# Patient Record
Sex: MALE | Race: WHITE
[De-identification: names, ages, dates, MRNs, and addresses within clinical notes are randomized per-mention and may not be internally consistent; named-entity substitution may affect disease eponyms.]

---

## 2021-05-06 ENCOUNTER — HOSPITAL ENCOUNTER (INPATIENT)
Dept: HOSPITAL 56 - MW.ED | Age: 76
LOS: 1 days | Discharge: NURSING, REHAB OR LONG-TERM CARE | DRG: 64 | End: 2021-05-07
Attending: INTERNAL MEDICINE | Admitting: INTERNAL MEDICINE
Payer: MEDICARE

## 2021-05-06 DIAGNOSIS — G93.6: ICD-10-CM

## 2021-05-06 DIAGNOSIS — Z20.822: ICD-10-CM

## 2021-05-06 DIAGNOSIS — R47.81: ICD-10-CM

## 2021-05-06 DIAGNOSIS — I63.9: Primary | ICD-10-CM

## 2021-05-06 DIAGNOSIS — R29.702: ICD-10-CM

## 2021-05-06 DIAGNOSIS — F17.200: ICD-10-CM

## 2021-05-06 DIAGNOSIS — Z66: ICD-10-CM

## 2021-05-06 DIAGNOSIS — I77.74: ICD-10-CM

## 2021-05-06 DIAGNOSIS — I10: ICD-10-CM

## 2021-05-06 LAB
BUN SERPL-MCNC: 15 MG/DL (ref 7–18)
CHLORIDE SERPL-SCNC: 104 MMOL/L (ref 98–107)
CO2 SERPL-SCNC: 23.6 MMOL/L (ref 21–32)
GLUCOSE SERPL-MCNC: 136 MG/DL (ref 74–106)
HBA1C BLD-MCNC: 5.7 %
LIPASE SERPL-CCNC: 75 U/L (ref 73–393)
POTASSIUM SERPL-SCNC: 3.5 MMOL/L (ref 3.5–5.1)
SODIUM SERPL-SCNC: 139 MMOL/L (ref 136–148)

## 2021-05-06 PROCEDURE — 93005 ELECTROCARDIOGRAM TRACING: CPT

## 2021-05-06 PROCEDURE — 71045 X-RAY EXAM CHEST 1 VIEW: CPT

## 2021-05-06 PROCEDURE — U0002 COVID-19 LAB TEST NON-CDC: HCPCS

## 2021-05-06 PROCEDURE — 84443 ASSAY THYROID STIM HORMONE: CPT

## 2021-05-06 PROCEDURE — 85730 THROMBOPLASTIN TIME PARTIAL: CPT

## 2021-05-06 PROCEDURE — 70450 CT HEAD/BRAIN W/O DYE: CPT

## 2021-05-06 PROCEDURE — 99285 EMERGENCY DEPT VISIT HI MDM: CPT

## 2021-05-06 PROCEDURE — 36415 COLL VENOUS BLD VENIPUNCTURE: CPT

## 2021-05-06 PROCEDURE — 70496 CT ANGIOGRAPHY HEAD: CPT

## 2021-05-06 PROCEDURE — 85025 COMPLETE CBC W/AUTO DIFF WBC: CPT

## 2021-05-06 PROCEDURE — 84439 ASSAY OF FREE THYROXINE: CPT

## 2021-05-06 PROCEDURE — 84484 ASSAY OF TROPONIN QUANT: CPT

## 2021-05-06 PROCEDURE — 85610 PROTHROMBIN TIME: CPT

## 2021-05-06 PROCEDURE — 83036 HEMOGLOBIN GLYCOSYLATED A1C: CPT

## 2021-05-06 PROCEDURE — 83690 ASSAY OF LIPASE: CPT

## 2021-05-06 PROCEDURE — 84481 FREE ASSAY (FT-3): CPT

## 2021-05-06 PROCEDURE — 80053 COMPREHEN METABOLIC PANEL: CPT

## 2021-05-06 PROCEDURE — 70498 CT ANGIOGRAPHY NECK: CPT

## 2021-05-06 PROCEDURE — 96374 THER/PROPH/DIAG INJ IV PUSH: CPT

## 2021-05-06 NOTE — PCM.CONS
H&P History of Present Illness





- General


Date of Service: 05/06/21


Admit Problem/Dx: 


                           Admission Diagnosis/Problem





Admission Diagnosis/Problem      Cerebellar infarction











- History of Present Illness


Initial Comments - Free Text/Narative: 





At about noon yesterday, he developed nausea, vomiting and diarrhea.  He was up 

vomiting throughout the night and noted dizziness, vertigo imbalance, left upper

limb numbness and clumsiness during the night.   He has mild headache currently.

 He endorses diplopia. He smokes.  





- Related Data


Allergies/Adverse Reactions: 


                                    Allergies











Allergy/AdvReac Type Severity Reaction Status Date / Time


 


No Known Allergies Allergy   Verified 05/06/21 11:55











Home Medications: 


                                    Home Meds





. [No Known Home Meds]  05/06/21 [History]











Past Medical History





- Infectious Disease History


Infectious Disease History: Reports: None





Social & Family History





- Family History


Family Medical History: No Pertinent Family History





- Tobacco Use


Tobacco Use Status *Q: Current Some Day Tobacco User





- Caffeine Use


Caffeine Use: Reports: None





- Recreational Drug Use


Recreational Drug Use: No





H&P Review of Systems





- Review of Systems:


Review Of Systems: See Below


General: Reports: No Symptoms


HEENT: Reports: Headaches


Cardiovascular: Reports: No Symptoms


Gastrointestinal: Reports: Diarrhea, Vomiting





Exam





- Exam


Exam: See Below





- Vital Signs


Vital Signs: 


                                Last Vital Signs











Temp  37.4 C   05/06/21 12:04


 


Pulse  73   05/06/21 12:04


 


Resp  18   05/06/21 12:04


 


BP  137/75   05/06/21 12:04


 


Pulse Ox  95   05/06/21 12:04











Weight: 93.894 kg





- Exam


Physical Exam Comments:: 





Neurological: 





Mental Status:


   General: Normal activity, good hygiene, appropriate appearance.


   Level of consciousness: Awake, alert.


   Orientation: Oriented to person, place, time and situation.


      


Cranial Nerves:  VFFTC, PERRL, Marked deviation of both eyes to left, slightly 

dysconjugate,intermittent nystagmus, Mild cerebellar dysarthria. 





Motor: Power 5/5 throughout.  





Sensation: Sensation is intact to temperature. 





Deep tendon reflexes:  3+ at knees, o/w 2+





Coordination:    Finger to nose mildly impaired on the left, heel to shin 

impaired bilaterally. 





Gait: Not assessed. 





- Patient Data


Lab Results Last 24 hrs: 


                         Laboratory Results - last 24 hr











  05/06/21 05/06/21 05/06/21 Range/Units





  08:24 08:25 08:25 


 


WBC   15.72 H   (4.0-11.0)  K/uL


 


RBC   5.35   (4.50-5.90)  M/uL


 


Hgb   15.7   (13.0-17.0)  g/dL


 


Hct   45.3   (38.0-50.0)  %


 


MCV   84.7   (80.0-98.0)  fL


 


MCH   29.3   (27.0-32.0)  pg


 


MCHC   34.7   (31.0-37.0)  g/dL


 


RDW Std Deviation   44.9   (28.0-62.0)  fl


 


RDW Coeff of Sumi   15   (11.0-15.0)  %


 


Plt Count   215   (150-400)  K/uL


 


MPV   11.00   (7.40-12.00)  fL


 


Neut % (Auto)   84.9 H   (48.0-80.0)  %


 


Lymph % (Auto)   10.0 L   (16.0-40.0)  %


 


Mono % (Auto)   4.9   (0.0-15.0)  %


 


Eos % (Auto)   0.1   (0.0-7.0)  %


 


Baso % (Auto)   0.1   (0.0-1.5)  %


 


Neut # (Auto)   13.4 H   (1.4-5.7)  K/uL


 


Lymph # (Auto)   1.6   (0.6-2.4)  K/uL


 


Mono # (Auto)   0.8   (0.0-0.8)  K/uL


 


Eos # (Auto)   0.0   (0.0-0.7)  K/uL


 


Baso # (Auto)   0.0   (0.0-0.1)  K/uL


 


Nucleated RBC %   0.0   /100WBC


 


Nucleated RBCs #   0   K/uL


 


INR    1.17  


 


APTT    25.5  (18.6-31.3)  SEC


 


Sodium     (136-148)  mmol/L


 


Potassium     (3.5-5.1)  mmol/L


 


Chloride     ()  mmol/L


 


Carbon Dioxide     (21.0-32.0)  mmol/L


 


BUN     (7.0-18.0)  mg/dL


 


Creatinine     (0.8-1.3)  mg/dL


 


Est Cr Clr Drug Dosing     mL/min


 


Estimated GFR (MDRD)     ml/min


 


Glucose     ()  mg/dL


 


Hemoglobin A1c  5.7   (4.5 - 6.2) %


 


Calcium     (8.5-10.1)  mg/dL


 


Total Bilirubin     (0.2-1.0)  mg/dL


 


AST     (15-37)  IU/L


 


ALT     (14-63)  IU/L


 


Alkaline Phosphatase     ()  U/L


 


Troponin I     (0.000-0.056)  ng/mL


 


Total Protein     (6.4-8.2)  g/dL


 


Albumin     (3.4-5.0)  g/dL


 


Globulin     (2.6-4.0)  g/dL


 


Albumin/Globulin Ratio     (0.9-1.6)  


 


Lipase     ()  U/L


 


TSH 3rd Generation     (0.36-3.74)  uIU/mL


 


SARS-CoV-2 RNA (TEZ)     (NEGATIVE)  














  05/06/21 05/06/21 Range/Units





  08:25 09:37 


 


WBC    (4.0-11.0)  K/uL


 


RBC    (4.50-5.90)  M/uL


 


Hgb    (13.0-17.0)  g/dL


 


Hct    (38.0-50.0)  %


 


MCV    (80.0-98.0)  fL


 


MCH    (27.0-32.0)  pg


 


MCHC    (31.0-37.0)  g/dL


 


RDW Std Deviation    (28.0-62.0)  fl


 


RDW Coeff of Sumi    (11.0-15.0)  %


 


Plt Count    (150-400)  K/uL


 


MPV    (7.40-12.00)  fL


 


Neut % (Auto)    (48.0-80.0)  %


 


Lymph % (Auto)    (16.0-40.0)  %


 


Mono % (Auto)    (0.0-15.0)  %


 


Eos % (Auto)    (0.0-7.0)  %


 


Baso % (Auto)    (0.0-1.5)  %


 


Neut # (Auto)    (1.4-5.7)  K/uL


 


Lymph # (Auto)    (0.6-2.4)  K/uL


 


Mono # (Auto)    (0.0-0.8)  K/uL


 


Eos # (Auto)    (0.0-0.7)  K/uL


 


Baso # (Auto)    (0.0-0.1)  K/uL


 


Nucleated RBC %    /100WBC


 


Nucleated RBCs #    K/uL


 


INR    


 


APTT    (18.6-31.3)  SEC


 


Sodium  139   (136-148)  mmol/L


 


Potassium  3.5   (3.5-5.1)  mmol/L


 


Chloride  104   ()  mmol/L


 


Carbon Dioxide  23.6   (21.0-32.0)  mmol/L


 


BUN  15   (7.0-18.0)  mg/dL


 


Creatinine  1.0   (0.8-1.3)  mg/dL


 


Est Cr Clr Drug Dosing  76.49   mL/min


 


Estimated GFR (MDRD)  > 60.0   ml/min


 


Glucose  136 H   ()  mg/dL


 


Hemoglobin A1c   (4.5 - 6.2) %


 


Calcium  9.1   (8.5-10.1)  mg/dL


 


Total Bilirubin  0.4   (0.2-1.0)  mg/dL


 


AST  18   (15-37)  IU/L


 


ALT  25   (14-63)  IU/L


 


Alkaline Phosphatase  75   ()  U/L


 


Troponin I  < 0.050   (0.000-0.056)  ng/mL


 


Total Protein  7.5   (6.4-8.2)  g/dL


 


Albumin  3.8   (3.4-5.0)  g/dL


 


Globulin  3.7   (2.6-4.0)  g/dL


 


Albumin/Globulin Ratio  1.0   (0.9-1.6)  


 


Lipase  75   ()  U/L


 


TSH 3rd Generation  0.01 L   (0.36-3.74)  uIU/mL


 


SARS-CoV-2 RNA (TEZ)   NEGATIVE  (NEGATIVE)  











Result Diagrams: 


                                 05/06/21 08:25





                                 05/06/21 08:25


Imaging Impressions Last 24 hrs: 


CTA head and neck 5/6/2021  abrupt cut off of right vertebral arter just above 

origin with minimal perfusion distal vertebral artery


CT head loss of gray white differentiation in right cerebellum





Sepsis Event Note





- Evaluation


Sepsis Screening Result: No Definite Risk





- Focused Exam


Vital Signs: 


                                   Vital Signs











  Temp Pulse Resp BP BP Pulse Ox


 


 05/06/21 12:04  37.4 C  73  18  137/75   95


 


 05/06/21 12:02  37.4 C  74  18  137/75   95


 


 05/06/21 11:17   70    157/83 H  97


 


 05/06/21 11:10   78  17   155/80 H  95


 


 05/06/21 10:39   73  16   164/88 H  96


 


 05/06/21 10:10   74  16   161/85 H  97


 


 05/06/21 09:55   76  16   153/77 H  95


 


 05/06/21 09:40   81  16   154/80 H  95


 


 05/06/21 09:25   80  16   155/83 H  94 L


 


 05/06/21 09:11   81  17   163/84 H  94 L


 


 05/06/21 08:28  37.5 C  106 H  17   161/79 H  98














*Q Meaningful Use (ADM)





- VTE *Q


VTE Anticoagulation Contraindications: Med/TX Not Indicated/Need





Consult PN Assessment/Plan


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


SNOMED Code(s): 386831370


   Code(s): I63.9 - CEREBRAL INFARCTION, UNSPECIFIED; I77.70 - DISSECTION OF 

UNSPECIFIED ARTERY   Current Visit: Yes   


Assessment:: 


-No TPA as outside window





-Thrombectomy is not recommended.  Data on posterior strokes is limited.  It is 

reasonable in many cases, but symptoms present nearly 24 hour upon presentation 

and finding seen on CT posing high risk for hemorrhage. 





-Start  mg daily.  Based on CADISS trial- no superiority of 

anticoagulation vs. anti platelets was noted, but overall risk was low, so 

inadequately powered.   Anticoagualation probably poses a higher risk of 

hemorrhage, and with location and size of his stroke, hemorrhagic transformation

 could be catastrophic.   I recommend  mg for 10-14 days then transition 

to anticoagulation with warfarin or DOAC. 





-MRI brain w/o contrast  eval extent of stroke





-echo, telemetry





-start high potency statin





-lipid A1c





-PT/OT/speech consult





-permissive HTN up to 180/105





-Endovascular intervention data is limited, so risk / benefit is not 

established.  It is generally reserved  for cases of recurrent stroke despite 

antithrombotic therapy. 





Problem List Initiated/Reviewed/Updated: Yes

## 2021-05-06 NOTE — PCM.HP.2
H&P History of Present Illness





- General


Date of Service: 05/06/21


Admit Problem/Dx: 


                           Admission Diagnosis/Problem





Admission Diagnosis/Problem      Cerebellar infarction








Source of Information: Patient


History Limitations: Reports: No Limitations





- History of Present Illness


Initial Comments - Free Text/Narative: 





This 74-year-old male with little to no past medical history presented to the ER

today with complaints of balance issues along with dizziness and left upper limb

numbness along with clumsiness.  He reports yesterday around noonhe started 

having some nausea and vomiting along with diarrhea.  He reports he was up 

vomiting most of the night.  He also reports difficulty with vision as he feels 

that may be doubled or like his vision is crossed which he reports started maybe

around 5:00 in the evening.  He reports that he is having difficulty walking 

likely secondary to his vision and feels very out of sorts when trying to walk. 

He denies any trauma no recent falls or motor vehicle accidents.  He denies any 

fevers chills, neck pain, palpitations, chest pain, shortness of breath.  He 

denies any abdominal pain no constipation or diarrhea, no dysuria or troubles u

rinating.  Denies any black or bloody bowel movements.  He denies recreational 

drug use and no alcohol use.  He reports he does not use tobacco on a regular 

basis but does intermittently smoke cigars.  He denies any family history of 

CAD, diabetes and or stroke.  Reports his father did have a stroke in his mid 

80s but otherwise most of family is otherwise healthy.





In the ER leukocytosis noted at 15,000 sodium 139, potassium 3.5, BUN 15 

creatinine 1.0 glucose 136.  Troponin negative TSH 0.01 T4 1.34 T3 2.3 Covid 

negative.  Due to stroke symptoms head CT was obtained which was noted to have 

question evolving loss of gray-white differentiation within the right superior 

and inferior cerebellum which may represent subacute infarct.  CTA of head and 

neck were then obtained secondary to findings on head CT.  CTA of head and neck 

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 

origin to its proximal to mid intracranial segment with retrograde filling of 

the mid distal intracranial segment by way of retrograde flow from the basilar 

trunk.  No intracranial large vessel occlusion or flow-limiting stenosis, mildly

patent cervical internal carotid artery and left vertebral artery.  On-call 

neurologist Dr. Pulido was consulted by ER physician at this time.  Patient 

is outside of the window for TPA and recommended initiation of antiplatelet 

therapy with full dose aspirin at 325 mg.  Dr. Pulido will consult and felt 

patient was safe for admission within our facility.





- Related Data


Allergies/Adverse Reactions: 


                                    Allergies











Allergy/AdvReac Type Severity Reaction Status Date / Time


 


No Known Allergies Allergy   Verified 05/06/21 11:55











Home Medications: 


                                    Home Meds





. [No Known Home Meds]  05/06/21 [History]











Past Medical History


Cardiovascular History: Reports: None.  Denies: Afib, Blood Clots/VTE/DVT, CAD, 

Stents


Respiratory History: Reports: None.  Denies: Asthma, COPD, PE


Gastrointestinal History: Reports: None


Genitourinary History: Reports: None


Neurological History: Reports: None.  Denies: CVA


Psychiatric History: Reports: None


Endocrine/Metabolic History: Reports: None.  Denies: Diabetes, Type II, 

Obesity/BMI 30+





- Infectious Disease History


Infectious Disease History: Reports: None





- Past Surgical History


HEENT Surgical History: Reports: None


Cardiovascular Surgical History: Reports: None


GI Surgical History: Reports: None


Endocrine Surgical History: Reports: None





Social & Family History





- Family History


Family Medical History: No Pertinent Family History


Cardiac: Denies: Afib, Blood Clots/VTE/DVT, Cardiomyopathy, Heart Failure, MI


Neurological: Reports: CVA (father in his 80s)


Endocrine/Metabolic: Reports: None





- Tobacco Use


Tobacco Use Status *Q: Never Tobacco User





- Caffeine Use


Caffeine Use: Reports: None





- Recreational Drug Use


Recreational Drug Use: No





H&P Review of Systems





- Review of Systems:


Review Of Systems: See Below


General: Reports: No Symptoms.  Denies: Fever, Chills, Malaise, Weakness


HEENT: Reports: Headaches, Vertigo, Visual Changes.  Denies: Sinus Congestion


Pulmonary: Reports: No Symptoms.  Denies: Shortness of Breath


Cardiovascular: Reports: No Symptoms.  Denies: Chest Pain


Gastrointestinal: Reports: No Symptoms.  Denies: Abdominal Pain, Black Stool, 

Bloody Stool, Nausea, Vomiting


Genitourinary: Reports: No Symptoms.  Denies: Dysuria, Frequency, Burning


Musculoskeletal: Reports: No Symptoms.  Denies: Neck Pain, Arm Pain, Muscle Pain


Skin: Reports: No Symptoms.  Denies: Erythema, Wound


Psychiatric: Reports: No Symptoms.  Denies: Anxiety, Agitation


Neurological: Reports: Headache, Numbness, Difficulty Walking, Change in Speech,

 Gait Disturbance


Hematologic/Lymphatic: Reports: No Symptoms


Immunologic: Reports: No Symptoms





Exam





- Exam


Exam: See Below





- Vital Signs


Vital Signs: 


                                Last Vital Signs











Temp  99.5 F   05/06/21 08:28


 


Pulse  74   05/06/21 10:10


 


Resp  16   05/06/21 10:10


 


BP  161/85 H  05/06/21 10:10


 


Pulse Ox  97   05/06/21 10:10











Weight: 93.894 kg





- Exam


General: Alert, Oriented, Cooperative


HEENT: Mucosa Moist & [place], Posterior Pharynx Clear, Pupils Equal, Pupils 

Reactive, Other (Deviation of both eyes to Left).  No: Hearing Intact (slightly 

hard of hearing)


Neck: Supple, Trachea Midline


Lungs: Clear to Auscultation, Normal Respiratory Effort


Cardiovascular: Regular Rate, Regular Rhythm, Normal S1, Normal S2.  No: 

Irregular Rhythm, Tachycardia


GI/Abdominal Exam: Normal Bowel Sounds, Soft, Non-Tender


Extremities: Normal Inspection, Normal Range of Motion, Non-Tender, No Pedal 

Edema


Skin: Warm, Dry, Intact


Neurological: Reflexes Equal Bilateral, Strength Equal Bilateral.  No: Normal 

Gait, Normal Speech (pressured and slurring noted)


Neuro Extensive - Mental Status: Alert, Oriented x3, Normal Mood/Affect, Normal 

Cognition, Memory Intact


Neuro Extensive - Motor, Sensory, Reflexes: Abnormal Finger to Nose, Abnormal 

Heel to [place], Abnormal Sensation (numbness noted to posterior L upper limb), Mot

or/Sensory Deficits.  No: Normal Gait


Psychiatric: Alert, Normal Affect, Normal Mood





- Patient Data


Lab Results Last 24 hrs: 


                         Laboratory Results - last 24 hr











  05/06/21 05/06/21 05/06/21 Range/Units





  08:25 08:25 08:25 


 


WBC  15.72 H    (4.0-11.0)  K/uL


 


RBC  5.35    (4.50-5.90)  M/uL


 


Hgb  15.7    (13.0-17.0)  g/dL


 


Hct  45.3    (38.0-50.0)  %


 


MCV  84.7    (80.0-98.0)  fL


 


MCH  29.3    (27.0-32.0)  pg


 


MCHC  34.7    (31.0-37.0)  g/dL


 


RDW Std Deviation  44.9    (28.0-62.0)  fl


 


RDW Coeff of Sumi  15    (11.0-15.0)  %


 


Plt Count  215    (150-400)  K/uL


 


MPV  11.00    (7.40-12.00)  fL


 


Neut % (Auto)  84.9 H    (48.0-80.0)  %


 


Lymph % (Auto)  10.0 L    (16.0-40.0)  %


 


Mono % (Auto)  4.9    (0.0-15.0)  %


 


Eos % (Auto)  0.1    (0.0-7.0)  %


 


Baso % (Auto)  0.1    (0.0-1.5)  %


 


Neut # (Auto)  13.4 H    (1.4-5.7)  K/uL


 


Lymph # (Auto)  1.6    (0.6-2.4)  K/uL


 


Mono # (Auto)  0.8    (0.0-0.8)  K/uL


 


Eos # (Auto)  0.0    (0.0-0.7)  K/uL


 


Baso # (Auto)  0.0    (0.0-0.1)  K/uL


 


Nucleated RBC %  0.0    /100WBC


 


Nucleated RBCs #  0    K/uL


 


INR   1.17   


 


APTT   25.5   (18.6-31.3)  SEC


 


Sodium    139  (136-148)  mmol/L


 


Potassium    3.5  (3.5-5.1)  mmol/L


 


Chloride    104  ()  mmol/L


 


Carbon Dioxide    23.6  (21.0-32.0)  mmol/L


 


BUN    15  (7.0-18.0)  mg/dL


 


Creatinine    1.0  (0.8-1.3)  mg/dL


 


Est Cr Clr Drug Dosing    76.49  mL/min


 


Estimated GFR (MDRD)    > 60.0  ml/min


 


Glucose    136 H  ()  mg/dL


 


Calcium    9.1  (8.5-10.1)  mg/dL


 


Total Bilirubin    0.4  (0.2-1.0)  mg/dL


 


AST    18  (15-37)  IU/L


 


ALT    25  (14-63)  IU/L


 


Alkaline Phosphatase    75  ()  U/L


 


Troponin I    < 0.050  (0.000-0.056)  ng/mL


 


Total Protein    7.5  (6.4-8.2)  g/dL


 


Albumin    3.8  (3.4-5.0)  g/dL


 


Globulin    3.7  (2.6-4.0)  g/dL


 


Albumin/Globulin Ratio    1.0  (0.9-1.6)  


 


Lipase    75  ()  U/L


 


TSH 3rd Generation    0.01 L  (0.36-3.74)  uIU/mL


 


SARS-CoV-2 RNA (TEZ)     (NEGATIVE)  














  05/06/21 Range/Units





  09:37 


 


WBC   (4.0-11.0)  K/uL


 


RBC   (4.50-5.90)  M/uL


 


Hgb   (13.0-17.0)  g/dL


 


Hct   (38.0-50.0)  %


 


MCV   (80.0-98.0)  fL


 


MCH   (27.0-32.0)  pg


 


MCHC   (31.0-37.0)  g/dL


 


RDW Std Deviation   (28.0-62.0)  fl


 


RDW Coeff of Sumi   (11.0-15.0)  %


 


Plt Count   (150-400)  K/uL


 


MPV   (7.40-12.00)  fL


 


Neut % (Auto)   (48.0-80.0)  %


 


Lymph % (Auto)   (16.0-40.0)  %


 


Mono % (Auto)   (0.0-15.0)  %


 


Eos % (Auto)   (0.0-7.0)  %


 


Baso % (Auto)   (0.0-1.5)  %


 


Neut # (Auto)   (1.4-5.7)  K/uL


 


Lymph # (Auto)   (0.6-2.4)  K/uL


 


Mono # (Auto)   (0.0-0.8)  K/uL


 


Eos # (Auto)   (0.0-0.7)  K/uL


 


Baso # (Auto)   (0.0-0.1)  K/uL


 


Nucleated RBC %   /100WBC


 


Nucleated RBCs #   K/uL


 


INR   


 


APTT   (18.6-31.3)  SEC


 


Sodium   (136-148)  mmol/L


 


Potassium   (3.5-5.1)  mmol/L


 


Chloride   ()  mmol/L


 


Carbon Dioxide   (21.0-32.0)  mmol/L


 


BUN   (7.0-18.0)  mg/dL


 


Creatinine   (0.8-1.3)  mg/dL


 


Est Cr Clr Drug Dosing   mL/min


 


Estimated GFR (MDRD)   ml/min


 


Glucose   ()  mg/dL


 


Calcium   (8.5-10.1)  mg/dL


 


Total Bilirubin   (0.2-1.0)  mg/dL


 


AST   (15-37)  IU/L


 


ALT   (14-63)  IU/L


 


Alkaline Phosphatase   ()  U/L


 


Troponin I   (0.000-0.056)  ng/mL


 


Total Protein   (6.4-8.2)  g/dL


 


Albumin   (3.4-5.0)  g/dL


 


Globulin   (2.6-4.0)  g/dL


 


Albumin/Globulin Ratio   (0.9-1.6)  


 


Lipase   ()  U/L


 


TSH 3rd Generation   (0.36-3.74)  uIU/mL


 


SARS-CoV-2 RNA (TEZ)  NEGATIVE  (NEGATIVE)  











Result Diagrams: 


                                 05/06/21 08:25





                                 05/06/21 08:25





Sepsis Event Note





- Evaluation


Sepsis Screening Result: No Definite Risk





- Focused Exam


Vital Signs: 


                                   Vital Signs











  Temp Pulse Resp BP Pulse Ox


 


 05/06/21 10:10   74  16  161/85 H  97


 


 05/06/21 09:55   76  16  153/77 H  95


 


 05/06/21 09:40   81  16  154/80 H  95


 


 05/06/21 09:25   80  16  155/83 H  94 L


 


 05/06/21 09:11   81  17  163/84 H  94 L


 


 05/06/21 08:28  99.5 F  106 H  17  161/79 H  98














- Problem List


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


SNOMED Code(s): 620078417


   ICD Code: I63.9 - CEREBRAL INFARCTION, UNSPECIFIED; I77.70 - DISSECTION OF 

UNSPECIFIED ARTERY   Status: Acute   Current Visit: Yes   


Problem List Initiated/Reviewed/Updated: Yes


Orders Last 24hrs: 


                               Active Orders 24 hr











 Category Date Time Status


 


 Patient Status [ADT] Routine ADT  05/06/21 10:09 Active


 


 Assess Neurological Status [RC] ASDIRECTED Care  05/06/21 08:34 Active


 


 Bedrest [RC] ASDIRECTED Care  05/06/21 08:34 Active


 


 Blood Glucose Check, Bedside [RC] STAT Care  05/06/21 08:35 Active


 


 Cardiac Monitoring [RC] .AS DIRECTED Care  05/06/21 08:34 Active


 


 EKG Documentation Completion [RC] STAT Care  05/06/21 08:34 Active


 


 Height and Weight [RC] UPON Care  05/06/21 08:34 Active


 


 Initiate Acute Stroke Protocol [RC] STAT Care  05/06/21 08:34 Active


 


 NIH Stroke Scale [RC] ASDIRECTED Care  05/06/21 08:34 Active


 


 Nursing Bedside Swallow Screen [RC] ASDIRECTED Care  05/06/21 08:34 Active


 


 Oxygen Therapy [RC] ASDIRECTED Care  05/06/21 08:34 Active


 


 Peripheral IV Care [RC] ASDIRECTED Care  05/06/21 08:35 Active


 


 Stroke Education, General [RC] Click to Edit Care  05/06/21 08:34 Active


 


 Vital Signs [RC] Q15M Care  05/06/21 08:34 Active


 


 UA RFX ERIKA AND CULT IF INDIC [URIN] Stat Lab  05/06/21 08:34 Ordered


 


 Sodium Chloride 0.9% [Normal Saline] Med  05/06/21 08:34 Active





 10 ml IV ASDIRECTED PRN   


 


 Sodium Chloride 0.9% [Normal Saline] 1,000 ml Med  05/06/21 08:34 Active





 IV NOW   


 


 Sodium Chloride 0.9% [Saline Flush] Med  05/06/21 08:34 Active





 10 ml FLUSH ASDIRECTED PRN   


 


 Sodium Chloride 0.9% [Saline Flush] Med  05/06/21 08:34 Active





 2.5 ml FLUSH ASDIRECTED PRN   


 


 Sodium Chloride 0.9% [Saline Flush] Med  05/06/21 08:34 Active





 2.5 ml FLUSH ASDIRECTED PRN   


 


 Peripheral IV Insertion Adult [OM.PC] Stat Oth  05/06/21 08:34 Ordered


 


 Peripheral IV Insertion Adult [OM.PC] Stat Oth  05/06/21 08:34 Ordered








                                Medication Orders





Sodium Chloride (Normal Saline)  1,000 mls @ 125 mls/hr IV NOW STA


   Stop: 05/06/21 16:33


   Last Admin: 05/06/21 09:15  Dose: 125 mls/hr


   Documented by: RAMÓN


Sodium Chloride (Sodium Chloride 0.9% 2.5 Ml Syringe)  2.5 ml FLUSH ASDIRECTED 

PRN


   PRN Reason: Keep Vein Open


Sodium Chloride (Sodium Chloride 0.9% 10 Ml Syringe)  10 ml FLUSH ASDIRECTED PRN


   PRN Reason: Keep Vein Open


   Last Admin: 05/06/21 09:15  Dose: 10 ml


   Documented by: RAMÓN


Sodium Chloride (Sodium Chloride 0.9% 2.5 Ml Syringe)  2.5 ml FLUSH ASDIRECTED 

PRN


   PRN Reason: Keep Vein Open


   Last Admin: 05/06/21 09:15  Dose: 2.5 ml


   Documented by: RAMÓN


Sodium Chloride (Sodium Chloride 0.9% 10 Ml Sdv)  10 ml IV ASDIRECTED PRN


   PRN Reason: IV Use








Assessment/Plan Comment:: 





This 75-year-old male admitted with acute Ischemic stroke due to vertebral 

artery dissection





1.  Acute ischemic stroke


-Appreciate Dr. Pulido's consultation and recommendations.


-Patient outside of window for TPA due to symptom onset timing and was yesterday

 afternoon/evening


-Brain without contrast ordered to further evaluate extent of stroke


-Echo ordered


-Monitor on telemetry for any arrhythmias


-Neuro and NIH scales every 4 hours for 24 hours.


-Continue aspirin 325 mg daily for 10 to 14 days then consider transition to 

anticoagulation with warfarin or DOAC


-Start a atorvastatin 80 obtain fasting lipid panel in the a.m.


-A1c 5.7


-TSH 0.01 consider reevaluation of this as an outpatient.


-Permissive hypertension up until blood pressures of 180/105 we will order 

labetalol as needed


-Consult PT OT to evaluate and treat, speech therapy unavailable at this time


-Hold any type of anticoagulation as transformation to hemorrhagic stroke as 

possible and would be catastrophic.


-Arrange follow-up with Dr. Pulido as well as PCP.


-Consider Zio patch on discharge


-Recheck labwork in am, leukocytosis could be reactive from CVA. UA pending. No 

other signs of infection currently. 








VTE prophylaxis: SCDs only due to risk of transformation to hemorrhagic stroke.





CODE STATUS: DNR/DNI per discussion with patient





Dispo: We will need monitoring for the next 2 to 3 days.

## 2021-05-06 NOTE — CR
Indication:



Possible stroke 



Comparison:



None available.



Technique:



Single AP view chest



Findings:



There is hyperinflation and chronic interstitial change.



There is no focal consolidation, effusion, or pneumothorax.



The cardiac silhouette is mildly prominent with minimal aortic tortuosity.



The bony thorax is grossly intact.



Impression:



Hyperinflation and chronic interstitial changes without evidence of dense 

consolidation.



Dictated by Marquez Madrid MD @ 5/6/2021 9:19:17 AM



Signed by Dr. Marquez Madrid @ May  6 2021  9:19AM

## 2021-05-06 NOTE — CT
DATE: 05/06/2021. 



CLINICAL HISTORY:



Patient with acute stroke. 



TECHNIQUE:



Standard helical CT image acquisition through the head and neck was 

performed after intravenous contrast bolus enhancement. Multiplanar 

reconstructed images were performed and interpreted.



COMPARISON:



Head CT dated 05/06/2021. 



FINDINGS:



The origins of the great vessels from the aortic arch are patent. The right 

vertebral artery is occluded at its origin. The origin of the left 

vertebral artery is patent. 



The common carotid arteries are patent.



There is no significant stenosis at the origin of the right internal 

carotid artery.



There is no significant stenosis at the origin of the left internal carotid 

artery.



The rest of the cervical segments of the internal carotid arteries are 

patent up to their intracranial segments. 



The right vertebral artery is occluded throughout the entirety of its 

cervical segment. The cervical segment of the left vertebral artery is 

patent. 



No intracranial proximal large vessel occlusion or flow-limiting luminal 

stenosis. The mid to distal intracranial right vertebral artery fills 

retrograde by way of basilar trunk. 



There is normal opacification of major intracranial venous structures.



Emphysematous changes within the visualized upper lungs. 



The thyroid gland is unremarkable.



There are degenerative changes in the cervical spine.



IMPRESSION:



1. 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 

origin to its proximal to mid intracranial segment, with retrograde filling 

of the mid distal intracranial segment by way of retrograde flow from the 

basilar trunk. 



2. No intracranial proximal large vessel occlusion or flow-limiting metal 

stenosis. 



3. Widely patent cervical internal carotid arteries and left vertebral 

artery.



Please note that all CT scans at this facility use dose modulation, 

iterative reconstruction, and/or weight-based dosing when appropriate to 

reduce radiation dose to as low as reasonably achievable.



Dictated by Trung Wilder MD @ 5/6/2021 11:43:50 AM



Signed by Dr. Trung Wilder @ May  6 2021 11:43AM

## 2021-05-06 NOTE — CT
Indication:



Stroke



Technique:



Volumetric multidetector CT images of the head were obtained without the 

administration of low osmolar intravenous contrast.



Comparison:



None available



Findings:



There is no intra-axial or extra-axial fluid collection.



There is no mass effect or midline shift.



There is age-related cortical atrophy with mild sulcal widening and ex 

vacuo dilatation of the lateral ventricles.



There are old lacunar changes of the basal ganglia. There is mild to 

moderate chronic small vessel disease change within the subcortical and 

periventricular white matter. There is demonstration of questionable lost 

gray-white differentiation of the medial superior aspect of the right 

cerebellum versus beam hardening artifact changes. Otherwise, the 

gray-white differentiation is grossly preserved.



The orbits and their contents are grossly within normal limits.



The bony calvarium is grossly intact.



The paranasal sinuses are clear.



The mastoid air cells are well aerated.



Impression:



Age related changes of the brain with questionable evolving loss of 

gray-white differentiation within the right superior and inferior 

cerebellum which may represent subacute infarct. Follow-up with MRI may be 

useful for improved characterization.



No evidence of intracranial hemorrhage.



Please note that all CT scans at this facility use dose modulation, 

iterative reconstruction, and/or weight-based dosing when appropriate to 

reduce radiation dose to as low as reasonably achievable.



Dictated by Marquez Madrid MD @ 5/6/2021 9:03:14 AM



Signed by Dr. Marquez Madrid @ May  6 2021  9:03AM

## 2021-05-06 NOTE — EDM.PDOC
ED HPI GENERAL MEDICAL PROBLEM





- General


Chief Complaint: Neuro Symptoms/Deficits


Stated Complaint: EMS


Time Seen by Provider: 05/06/21 08:57





- History of Present Illness


INITIAL COMMENTS - FREE TEXT/NARRATIVE: 





HISTORY AND PHYSICAL:





History of present illness:


This is a 75-year-old gentleman with no significant past medical history who 

presents ER today secondary to new onset slurred speech, dizziness, gait 

instability that started yesterday.  Patient reports his last known well was 

yesterday approximately 5 PM.  Patient reports that he related all the symptoms 

secondary to what he felt was related to food poisoning.  Patient reports that 

starting yesterday he had a significant amount of nausea, vomiting, diarrhea.  

Patient reports that he feels extremely dehydrated.  Patient reports that he is 

had over 10 episodes of vomiting yesterday with 2 loose bowel movements.  

Patient reports that he does not believe his emesis is secondary to the 

sensation of vertigo/being on a roller coaster and rather he feels that it is 

related more to an uneasy feeling in his stomach.  Patient reports that 

yesterday around 5 PM he started having difficulty walking and he related that 

secondary to his dehydration.  Patient denies any recent fevers, shakes, chills.

 Patient denies any double vision or blurred vision.  Patient does report that 

he feels a his speech is somewhat pressured but felt that this was secondary to 

his mouth being extremely dry last night.  Patient denies any dysuria, 

frequency, urgency, chest pain, shortness of breath.  Patient reports that he 

has no abdominal pain although he feels "queasy "in his stomach.  Patient denies

any weakness to his upper or lower extremities.  Patient denies any paresthesias

to his upper or lower extremities.  Patient denies any loss of bowel or bladder 

function.  Patient denies any headache, neck pain.  Patient denies any recent 

falls or head trauma.





Patient denies any history of hypertension, diabetes, liver, lung, kidney 

problems.  Patient denies any prior TIA/stroke/CAD in the past.


Patient denies any prior abdominal or chest surgeries.  Patient denies any 

recent cancers.


Patient has any tobacco, alcohol, drugs.





Patient reports that he currently lives alone in company housing.





Review of systems: 


As per history of present illness and below otherwise all systems reviewed and 

negative.





Past medical history: 


As per history of present illness and as reviewed below otherwise 

noncontributory.





Surgical history: 


As per history of present illness and as reviewed below otherwise 

noncontributory.





Social history: 


No reported history of drug abuse.





Family history: 


As per history of present illness and as reviewed below otherwise 

noncontributory.





Physical exam:





This patient was seen and evaluated during the 2020 SARS-CoV-2 novel coronavirus

pandemic period.  Community viral transmission is ongoing at time of this 

encounter and the emergency department is operating under pandemic response 

procedures.





Constitutional: Patient is oriented to person, place, and time.  Appears well-

developed and well-nourished.  No distress.


 HEENT: Moist mucous membranes


 Head: Normocephalic and atraumatic


 Eyes: Right eye exhibits no discharge.  Left eye exhibits no discharge.  No 

scleral icterus


 Neck: Normal range of motion.  No tracheal deviation present.


 Cardiovascular: Normal rate and regular rhythm.


 Pulmonary: Effort normal, no respiratory distress.


 Abdominal: No distention


 Musculoskeletal: Normal range of motion


 Neurologic: Alert and oriented to person, place and time.


 Skin: Pink, warm and dry.  


 Psychiatric: Normal mood and affect.  Behavior is normal.  Judgment and thought

content normal.


 Nursing note and vital signs have been reviewed





Neuro: A&Ox3. Cranial nerves II-XII grossly intact, 5/5 strength to bilateral 

upper and lower extremities, sensation intact to bilateral upper and lower 

extremities, positive nystagmus on both left lateral and right lateral gaze, 

PERRLA, EOMI, slightly pressured speech with intermittent episodes of difficulty

saying particular words however upon repeating the word patient is able to say 

correctly, proprioception intact to bilateral lower extremities, patient has 

difficulty with finger-to-nose with right-sided finger-to-nose worse than left-

sided finger-to-nose.  Patient has slight difficulty with heel-to-shin.  Gait 

not tested secondary to patient reporting he feels unsteady.





1a) Level of consciousness: 0=alert; 1=not alert but arousable by minor 

stimulation; 2=not alert: requires repeated stimulation to attend or is obtunded

and requires strong or painful stimulation to make movements; 3=responds only 

with reflex motor or autonomic effects or totally unresponsive, flaccid and 

areflexic





 SCORE 0





1b)  LOC questions ("what month is it?", "how old are you?"):  0=answers both 

correctly; 1=answers one correctly; 2=answers neither correctly   





 SCORE 0





1c) LOC commands (command patient to "open and close your eyes.  and release

your hand.): 0=performs both correctly; 1=performs one correctly; 2=performs 

neither correctly





 SCORE 0





2)  Best gaze ("follow my finger"): 0=normal; 1=partial gaze palsy; 2=forced 

deviation or total gaze paresis





 SCORE 0





3) Visual fields (use confrontation, finger counting, or visual threat. confront

upper/lower quadrants of visual field): 0=no visual loss; 1=partial hemianopsia;

2=complete hemianopsia; 3=bilateral hemianopsia





 SCORE 0





4) Facial palsy (by words or pantomime, encourage patient to: "Show me your 

teeth. Raise your eyebrows. Close your eyes."):  0=normal symmetrical movement; 

1=minor paralysis (flattened nasolabial fold, asymmetry on smiling); 2=partial 

paralysis (lower face); 3=complete paralysis





 SCORE 0





5) Arm motor (alternately position patient's arms. extend each arm with palms 

down [90 degrees if sitting, 45 degrees if supine] - test each arm in turn and 

start with nonparetic arm first): 0=no drift; 1=drift (arm falls before 10 

seconds); 2=some effort vs. gravity; 3=no effort vs. gravity; 4=no movement; UN 

(untestable)=amputation or joint  fusion





 SCORE 0





6) Leg motor (alternately position patient's legs. extend each leg [30 degrees, 

always while supine] - test nonparetic leg first): 0=no drift; 1=drift (leg 

falls before 5 seconds); 2=some effort vs. gravity; 3= no effort vs. gravity; 

4=no movement; UN=amputation or joint fusion





 SCORE 0





7) Limb ataxia (ask patient [eyes open] to: "touch your finger to your nose. 

touch your heel to your shin"): 0=absent; 1=present in one limb; 2=present in 

two or more limbs; UN=amputation or joint fusion





 SCORE 2





8) Sensory (test as many body parts as possible [arms and not hands, legs, 

trunk, face] for sensation using pinprick or noxious stimuli [in the obtunded or

 aphasic patient]): 0=normal; 1=mild to moderate sensory loss; 2=severe to total

sensory loss





 SCORE 0





9) Best language (using pictures and a sentence list, ask patient to "describe 

what you see in this picture. Name the items in this picture. Read these 

sentences"): 0=no aphasia, 1=mild to moderate aphasia; 2=severe aphasia; 3=mute,

global aphasia





 SCORE 0





10) Dysarthria (using a simple word list, ask patient to: "read these words" or 

"repeat these words"): 0=normal articulation; 1=mild to moderate dysarthria; 

2=severe dysarthria; UN=intubated or other physical barrier





 SCORE [0]





11) Extinction and inattention (sufficient information to determine these scores

 may have been obtained during the prior testing): 0=no abnormality; 1=visual, 

tactile, auditory, spatial or personal inattention; 2=profound ole-inattention 

or extinction to more than one modality





 SCORE 0














TOTAL NIHSS Score:2





Diagnostics:


EKG:


As interpreted by ER physician: Surjit:


Nonspecific ST-T wave abnormalities


Normal axis


No evidence of ST elevation MI


Normal sinus rhythm heart rate of 73





Chest Xray:


Normal cardiac silhouette


No infiltrates or effusions identified.


No PTX


No evidence of acute bony fracture.


As interpreted by ER MD: Surjit





CT scan of the head reveals no evidence of intracranial hemorrhage.  Age-related

 changes of the brain with questionable evolving loss of gray-white 

differentiation within the right superior and inferior cerebellum which may 

represent subacute infarct.  Follow-up with MRI may be useful for improved 

characterization.





CTA of head and neck reveals an abrupt cut off of perfusion of the right 

vertebral artery just above the origin extending from the thoracic inlet to the 

V4 portion with minimal perfusion of the distal right vertebral artery from the 

patent dominant left vertebral artery.  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

 vertebral artery dissection.





CTA of head: there is demonstration of abrupt cut off of the right vertebral 

artery with minimal likely collateral perfusion from the patent left vertebral 

artery likely representing dissection.





CBC, CMP within normal limits.





Accu-Chek: Within normal limits





Therapeutics:


Aspirin 325 mg p.o.





Assessment and plan:


This is a 75-year-old gentleman who presents ER today secondary to dizziness, 

ataxia and unstable gait with significant mount of nausea vomiting and diarrhea.

  Patient's presentation on exam is consistent with an acute cerebellar infarct.

  Patient's last known well was approximately 16 hours prior to arrival which 

puts him outside the window for thrombolytic therapy.  Patient CT scan reveals a

 likely subacute cerebellar infarct.





Patient will be given aspirin 325 mg p.o. in the ED.  Patient will need 

admission.





Case discussed with Dr. Pulido, concern regarding CTA of the head and neck 

revealing evolving vertebral artery dissection.  At this time, she is 

recommending initiating antiplatelet therapy with aspirin 325 mg and she will 

consult for possible anticoagulation.  She feels patient would be stable and can

 be managed here at [facility] without the need to transfer to a higher 

level of care.








Definitive disposition and diagnosis as appropriate pending reevaluation and 

review of above.











- Related Data


                                    Allergies











Allergy/AdvReac Type Severity Reaction Status Date / Time


 


No Known Allergies Allergy   Verified 05/06/21 13:21











Home Meds: 


                                    Home Meds





Multivitamin 1 each PO DAILY 05/06/21 [History]











ED ROS GENERAL





- Review of Systems


Review Of Systems: See Below





ED EXAM, GENERAL





- Physical Exam


Exam: See Below





Course





- Vital Signs


Last Recorded V/S: 


                                Last Vital Signs











Temp  99.2 F   05/06/21 16:19


 


Pulse  69   05/06/21 16:19


 


Resp  16   05/06/21 16:19


 


BP  145/72 H  05/06/21 16:19


 


Pulse Ox  96   05/06/21 16:19














- Orders/Labs/Meds


Orders: 


                               Active Orders 24 hr











 Category Date Time Status


 


 Patient Status [ADT] Routine ADT  05/06/21 10:09 Active


 


 Nursing Bedside Swallow Screen [RC] ASDIRECTED Care  05/06/21 08:34 Active








                                Medication Orders





Acetaminophen (Acetaminophen 325 Mg Tab)  650 mg PO Q4H PRN


   PRN Reason: Pain (Mild 1-3)/fever


   Last Admin: 05/06/21 16:14  Dose: 650 mg


   Documented by: JODI


Aspirin (Aspirin 325 Mg Tab.Ec)  325 mg PO DAILY [facility]


Atorvastatin Calcium (Atorvastatin 40 Mg Tab)  80 mg PO BEDTIME [facility]


Docusate Sodium (Docusate Sodium 100 Mg Cap)  100 mg PO DAILY [facility]


Labetalol HCl (Labetalol 100 Mg/20 Ml Mdv)  20 mg IVPUSH Q4H PRN


   PRN Reason: BP over 180/105


Multivitamins/Minerals/Vitamin C (Multivitamin Tab)  1 tab PO DAILY [facility]


Ondansetron HCl (Ondansetron 4 Mg/2 Ml Sdv)  4 mg IVPUSH Q4H PRN


   PRN Reason: Nausea


Sodium Chloride (Sodium Chloride 0.9% 2.5 Ml Syringe)  2.5 ml FLUSH ASDIRECTED 

PRN


   PRN Reason: Keep Vein Open








Labs: 


                                Laboratory Tests











  05/06/21 05/06/21 05/06/21 Range/Units





  08:24 08:24 08:25 


 


WBC    15.72 H  (4.0-11.0)  K/uL


 


RBC    5.35  (4.50-5.90)  M/uL


 


Hgb    15.7  (13.0-17.0)  g/dL


 


Hct    45.3  (38.0-50.0)  %


 


MCV    84.7  (80.0-98.0)  fL


 


MCH    29.3  (27.0-32.0)  pg


 


MCHC    34.7  (31.0-37.0)  g/dL


 


RDW Std Deviation    44.9  (28.0-62.0)  fl


 


RDW Coeff of Sumi    15  (11.0-15.0)  %


 


Plt Count    215  (150-400)  K/uL


 


MPV    11.00  (7.40-12.00)  fL


 


Neut % (Auto)    84.9 H  (48.0-80.0)  %


 


Lymph % (Auto)    10.0 L  (16.0-40.0)  %


 


Mono % (Auto)    4.9  (0.0-15.0)  %


 


Eos % (Auto)    0.1  (0.0-7.0)  %


 


Baso % (Auto)    0.1  (0.0-1.5)  %


 


Neut # (Auto)    13.4 H  (1.4-5.7)  K/uL


 


Lymph # (Auto)    1.6  (0.6-2.4)  K/uL


 


Mono # (Auto)    0.8  (0.0-0.8)  K/uL


 


Eos # (Auto)    0.0  (0.0-0.7)  K/uL


 


Baso # (Auto)    0.0  (0.0-0.1)  K/uL


 


Nucleated RBC %    0.0  /100WBC


 


Nucleated RBCs #    0  K/uL


 


INR     


 


APTT     (18.6-31.3)  SEC


 


Sodium     (136-148)  mmol/L


 


Potassium     (3.5-5.1)  mmol/L


 


Chloride     ()  mmol/L


 


Carbon Dioxide     (21.0-32.0)  mmol/L


 


BUN     (7.0-18.0)  mg/dL


 


Creatinine     (0.8-1.3)  mg/dL


 


Est Cr Clr Drug Dosing     mL/min


 


Estimated GFR (MDRD)     ml/min


 


Glucose     ()  mg/dL


 


Hemoglobin A1c   5.7  (4.5 - 6.2) %


 


Calcium     (8.5-10.1)  mg/dL


 


Total Bilirubin     (0.2-1.0)  mg/dL


 


AST     (15-37)  IU/L


 


ALT     (14-63)  IU/L


 


Alkaline Phosphatase     ()  U/L


 


Troponin I     (0.000-0.056)  ng/mL


 


Total Protein     (6.4-8.2)  g/dL


 


Albumin     (3.4-5.0)  g/dL


 


Globulin     (2.6-4.0)  g/dL


 


Albumin/Globulin Ratio     (0.9-1.6)  


 


Lipase     ()  U/L


 


Free T4  1.34    (0.76-1.46)  ng/dL


 


Free T3  2.30    (2.18-3.98)  pg/mL


 


TSH 3rd Generation     (0.36-3.74)  uIU/mL


 


SARS-CoV-2 RNA (TEZ)     (NEGATIVE)  














  05/06/21 05/06/21 05/06/21 Range/Units





  08:25 08:25 09:37 


 


WBC     (4.0-11.0)  K/uL


 


RBC     (4.50-5.90)  M/uL


 


Hgb     (13.0-17.0)  g/dL


 


Hct     (38.0-50.0)  %


 


MCV     (80.0-98.0)  fL


 


MCH     (27.0-32.0)  pg


 


MCHC     (31.0-37.0)  g/dL


 


RDW Std Deviation     (28.0-62.0)  fl


 


RDW Coeff of Sumi     (11.0-15.0)  %


 


Plt Count     (150-400)  K/uL


 


MPV     (7.40-12.00)  fL


 


Neut % (Auto)     (48.0-80.0)  %


 


Lymph % (Auto)     (16.0-40.0)  %


 


Mono % (Auto)     (0.0-15.0)  %


 


Eos % (Auto)     (0.0-7.0)  %


 


Baso % (Auto)     (0.0-1.5)  %


 


Neut # (Auto)     (1.4-5.7)  K/uL


 


Lymph # (Auto)     (0.6-2.4)  K/uL


 


Mono # (Auto)     (0.0-0.8)  K/uL


 


Eos # (Auto)     (0.0-0.7)  K/uL


 


Baso # (Auto)     (0.0-0.1)  K/uL


 


Nucleated RBC %     /100WBC


 


Nucleated RBCs #     K/uL


 


INR  1.17    


 


APTT  25.5    (18.6-31.3)  SEC


 


Sodium   139   (136-148)  mmol/L


 


Potassium   3.5   (3.5-5.1)  mmol/L


 


Chloride   104   ()  mmol/L


 


Carbon Dioxide   23.6   (21.0-32.0)  mmol/L


 


BUN   15   (7.0-18.0)  mg/dL


 


Creatinine   1.0   (0.8-1.3)  mg/dL


 


Est Cr Clr Drug Dosing   76.49   mL/min


 


Estimated GFR (MDRD)   > 60.0   ml/min


 


Glucose   136 H   ()  mg/dL


 


Hemoglobin A1c    (4.5 - 6.2) %


 


Calcium   9.1   (8.5-10.1)  mg/dL


 


Total Bilirubin   0.4   (0.2-1.0)  mg/dL


 


AST   18   (15-37)  IU/L


 


ALT   25   (14-63)  IU/L


 


Alkaline Phosphatase   75   ()  U/L


 


Troponin I   < 0.050   (0.000-0.056)  ng/mL


 


Total Protein   7.5   (6.4-8.2)  g/dL


 


Albumin   3.8   (3.4-5.0)  g/dL


 


Globulin   3.7   (2.6-4.0)  g/dL


 


Albumin/Globulin Ratio   1.0   (0.9-1.6)  


 


Lipase   75   ()  U/L


 


Free T4     (0.76-1.46)  ng/dL


 


Free T3     (2.18-3.98)  pg/mL


 


TSH 3rd Generation   0.01 L   (0.36-3.74)  uIU/mL


 


SARS-CoV-2 RNA (TEZ)    NEGATIVE  (NEGATIVE)  











Meds: 


Medications











Generic Name Dose Route Start Last Admin





  Trade Name Freq  PRN Reason Stop Dose Admin


 


Acetaminophen  650 mg  05/06/21 12:02  05/06/21 16:14





  Acetaminophen 325 Mg Tab  PO   650 mg





  Q4H PRN   Administration





  Pain (Mild 1-3)/fever  


 


Aspirin  325 mg  05/07/21 09:00 





  Aspirin 325 Mg Tab.Ec  PO  





  DAILY [facility]  


 


Atorvastatin Calcium  80 mg  05/06/21 21:00 





  Atorvastatin 40 Mg Tab  PO  





  BEDTIME [facility]  


 


Docusate Sodium  100 mg  05/07/21 09:00 





  Docusate Sodium 100 Mg Cap  PO  





  DAILY [facility]  


 


Labetalol HCl  20 mg  05/06/21 12:54 





  Labetalol 100 Mg/20 Ml Mdv  IVPUSH  





  Q4H PRN  





  BP over 180/105  


 


Multivitamins/Minerals/Vitamin C  1 tab  05/07/21 09:00 





  Multivitamin Tab  PO  





  DAILY [facility]  


 


Ondansetron HCl  4 mg  05/06/21 12:02 





  Ondansetron 4 Mg/2 Ml Sdv  IVPUSH  





  Q4H PRN  





  Nausea  


 


Sodium Chloride  2.5 ml  05/06/21 12:02 





  Sodium Chloride 0.9% 2.5 Ml Syringe  FLUSH  





  ASDIRECTED PRN  





  Keep Vein Open  














Discontinued Medications














Generic Name Dose Route Start Last Admin





  Trade Name Freq  PRN Reason Stop Dose Admin


 


Aspirin  325 mg  05/06/21 09:57  05/06/21 10:07





  Aspirin 325 Mg Tab  PO  05/06/21 09:58  325 mg





  ONETIME ONE   Administration


 


Sodium Chloride  1,000 mls @ 125 mls/hr  05/06/21 08:34  05/06/21 09:15





  Normal Saline  IV  05/06/21 16:33  125 mls/hr





  NOW STA   Administration


 


Sodium Chloride  1,000 mls @ 999 mls/hr  05/06/21 08:34  05/06/21 09:15





  Normal Saline  IV  05/06/21 09:34  999 mls/hr





  BOLUS ONE   Administration


 


Iopamidol  100 ml  05/06/21 10:08  05/06/21 10:09





  Iopamidol 755 Mg/Ml 500 Ml Multipack Bottle  IVPUSH  05/06/21 10:09  100 ml





  ONETIME STA   Administration


 


Ondansetron HCl  4 mg  05/06/21 08:36  05/06/21 09:15





  Ondansetron 4 Mg/2 Ml Sdv  IVPUSH  05/06/21 08:37  4 mg





  ONETIME ONE   Administration


 


Sodium Chloride  2.5 ml  05/06/21 08:34 





  Sodium Chloride 0.9% 2.5 Ml Syringe  FLUSH  





  ASDIRECTED PRN  





  Keep Vein Open  


 


Sodium Chloride  10 ml  05/06/21 08:34  05/06/21 09:15





  Sodium Chloride 0.9% 10 Ml Syringe  FLUSH   10 ml





  ASDIRECTED PRN   Administration





  Keep Vein Open  


 


Sodium Chloride  2.5 ml  05/06/21 08:34  05/06/21 09:15





  Sodium Chloride 0.9% 2.5 Ml Syringe  FLUSH   2.5 ml





  ASDIRECTED PRN   Administration





  Keep Vein Open  


 


Sodium Chloride  10 ml  05/06/21 08:34 





  Sodium Chloride 0.9% 10 Ml Sdv  IV  





  ASDIRECTED PRN  





  IV Use  














Departure





- Departure


Time of Disposition: 19:16


Disposition: Admitted As Inpatient 68


Condition: Good


Clinical Impression: 


 Vertebral artery dissection, Cerebellar stroke








- Discharge Information





Sepsis Event Note (ED)





- Evaluation


Sepsis Screening Result: No Definite Risk





- Focused Exam


Vital Signs: 


                                   Vital Signs











  Temp Pulse Resp BP Pulse Ox


 


 05/06/21 10:39   73  16  164/88 H  96


 


 05/06/21 10:10   74  16  161/85 H  97


 


 05/06/21 09:55   76  16  153/77 H  95


 


 05/06/21 09:40   81  16  154/80 H  95


 


 05/06/21 09:25   80  16  155/83 H  94 L


 


 05/06/21 09:11   81  17  163/84 H  94 L


 


 05/06/21 08:28  99.5 F  106 H  17  161/79 H  98














- My Orders


Last 24 Hours: 


My Active Orders





05/06/21 08:34


Nursing Bedside Swallow Screen [RC] ASDIRECTED 





05/06/21 10:09


Patient Status [ADT] Routine 














- Assessment/Plan


Last 24 Hours: 


My Active Orders





05/06/21 08:34


Nursing Bedside Swallow Screen [RC] ASDIRECTED 





05/06/21 10:09


Patient Status [ADT] Routine

## 2021-05-06 NOTE — MR
Indication:



Infarct. 



Technique:



T1 sagittal as well as diffusion, FLAIR and T2 axial images were obtained. 



No IV contrast. 



Comparison :



None available. 



Findings :



There is a 15 mm acute infarct in the right cerebellum, associated with 

mild right posterior fossa mass effect and partial effacement of the 4th 

ventricle. The infarct extends to the right cerebellar tonsil. Additional 

small foci of acute cortical infarction are noted in the parasagittal 

aspects of the left occipital and right parietal lobes. 



A small focus of petechial hemorrhage is seen in the left occipital 

infarct. No additional intracranial hemorrhage. No global mass effect. No 

ventricular obstruction. 



Background mild small-vessel ischemic changes. Baseline minimal cerebral 

atrophy. 



Apparent abnormal flow void in the right posterior inferior cerebellar 

artery. Grossly normal flow voids are maintained in the remainder of the 

intracranial vascular structures. The craniovertebral junction is 

unremarkable, with a patent foramen magnum. Both temporal bones are well 

aerated. The paranasal sinuses are clear.  



Impression:



1. Large 50 mm acute infarct involving much of the right cerebellar 

hemisphere, sparing its inferomedial aspect. 



2. Small acute cortical infarcts in the parasagittal left occipital lobe 

and parasagittal right parietal lobe. 



3. Mild right posterior fossa mass effect at the present time. Petechial 

hemorrhage in the left occipital infarct, but no space-occupying 

hemorrhage. 



4. Background mild small vessel ischemic change and minimal cerebral 

atrophy.



Dictated by Xander Hall MD @ 5/7/2021 9:03:40 AM



Signed by Dr. Xander Hall @ May  7 2021  9:03AM

## 2021-05-06 NOTE — CT
DATE: 05/06/2021. 



CLINICAL HISTORY:



Patient with acute stroke. 



TECHNIQUE:



Standard helical CT image acquisition through the head and neck was 

performed after intravenous contrast bolus enhancement. Multiplanar 

reconstructed images were performed and interpreted.



COMPARISON:



Head CT dated 05/06/2021. 



FINDINGS:



The origins of the great vessels from the aortic arch are patent. The right 

vertebral artery is occluded at its origin. The origin of the left 

vertebral artery is patent. 



The common carotid arteries are patent.



There is no significant stenosis at the origin of the right internal 

carotid artery.



There is no significant stenosis at the origin of the left internal carotid 

artery.



The rest of the cervical segments of the internal carotid arteries are 

patent up to their intracranial segments. 



The right vertebral artery is occluded throughout the entirety of its 

cervical segment. The cervical segment of the left vertebral artery is 

patent. 



No intracranial proximal large vessel occlusion or flow-limiting luminal 

stenosis. The mid to distal intracranial right vertebral artery fills 

retrograde by way of basilar trunk. 



There is normal opacification of major intracranial venous structures.



Emphysematous changes within the visualized upper lungs. 



The thyroid gland is unremarkable.



There are degenerative changes in the cervical spine.



IMPRESSION:



1. 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 

origin to its proximal to mid intracranial segment, with retrograde filling 

of the mid distal intracranial segment by way of retrograde flow from the 

basilar trunk. 



2. No intracranial proximal large vessel occlusion or flow-limiting metal 

stenosis. 



3. Widely patent cervical internal carotid arteries and left vertebral 

artery.



Please note that all CT scans at this facility use dose modulation, 

iterative reconstruction, and/or weight-based dosing when appropriate to 

reduce radiation dose to as low as reasonably achievable.



Dictated by Trung Wilder MD @ 5/6/2021 11:43:05 AM



Signed by Dr. Trung Wilder @ May  6 2021 11:43AM

## 2021-05-07 LAB
BUN SERPL-MCNC: 13 MG/DL (ref 7–18)
CHLORIDE SERPL-SCNC: 105 MMOL/L (ref 98–107)
CO2 SERPL-SCNC: 24.9 MMOL/L (ref 21–32)
GLUCOSE SERPL-MCNC: 107 MG/DL (ref 74–106)
POTASSIUM SERPL-SCNC: 3.7 MMOL/L (ref 3.5–5.1)
SODIUM SERPL-SCNC: 138 MMOL/L (ref 136–148)

## 2021-05-07 NOTE — PCM.PN
- General Info


Date of Service: 05/07/21


Admission Dx/Problem (Free Text): 


                           Admission Diagnosis/Problem





Admission Diagnosis/Problem      Cerebellar infarction








Subjective Update: 





Feeling improved today.  Vision has improved a lot reports he is able to follow 

TV and watch this without significant vision impairment.  Reports he is feeling 

more steady and more in control of arm and leg movements.  Denies any chest pain

and no shortness of breath.  We did discuss possibility of the need of 

rehabilitation after this hospitalization patient is open but would prefer 

something closer home medicine [facility].  Case management is aware.


Functional Status: Reports: Pain Controlled, Tolerating Diet, Ambulating (With 

assist), Urinating





- Review of Systems


General: Denies: Weakness, Fatigue, Malaise


HEENT: Reports: Headaches (Has right-sided pain), Visual Changes (Much improved 

since yesterday).  Denies: Sore Throat


Pulmonary: Reports: No Symptoms.  Denies: Shortness of Breath


Cardiovascular: Reports: No Symptoms.  Denies: Chest Pain


Gastrointestinal: Reports: No Symptoms.  Denies: Abdominal Pain, Nausea, 

Vomiting


Genitourinary: Reports: No Symptoms.  Denies: Dysuria, Frequency


Musculoskeletal: Reports: No Symptoms


Skin: Reports: No Symptoms


Neurological: Reports: No Symptoms


Psychiatric: Reports: No Symptoms





- Patient Data


Vitals - Most Recent: 


                                Last Vital Signs











Temp  98.6 F   05/07/21 03:29


 


Pulse  66   05/07/21 03:29


 


Resp  19   05/07/21 03:29


 


BP  137/85   05/07/21 03:29


 


Pulse Ox  97   05/07/21 03:29











Weight - Most Recent: 95.118 kg


I&O - Last 24 Hours: 


                                 Intake & Output











 05/06/21 05/07/21 05/07/21





 22:59 06:59 14:59


 


Intake Total 1139 1200 


 


Output Total  1950 


 


Balance 1139 -750 











Lab Results Last 24 Hours: 


                         Laboratory Results - last 24 hr











  05/06/21 05/06/21 05/06/21 Range/Units





  08:24 08:24 08:25 


 


WBC    15.72 H  (4.0-11.0)  K/uL


 


RBC    5.35  (4.50-5.90)  M/uL


 


Hgb    15.7  (13.0-17.0)  g/dL


 


Hct    45.3  (38.0-50.0)  %


 


MCV    84.7  (80.0-98.0)  fL


 


MCH    29.3  (27.0-32.0)  pg


 


MCHC    34.7  (31.0-37.0)  g/dL


 


RDW Std Deviation    44.9  (28.0-62.0)  fl


 


RDW Coeff of Sumi    15  (11.0-15.0)  %


 


Plt Count    215  (150-400)  K/uL


 


MPV    11.00  (7.40-12.00)  fL


 


Neut % (Auto)    84.9 H  (48.0-80.0)  %


 


Lymph % (Auto)    10.0 L  (16.0-40.0)  %


 


Mono % (Auto)    4.9  (0.0-15.0)  %


 


Eos % (Auto)    0.1  (0.0-7.0)  %


 


Baso % (Auto)    0.1  (0.0-1.5)  %


 


Neut # (Auto)    13.4 H  (1.4-5.7)  K/uL


 


Lymph # (Auto)    1.6  (0.6-2.4)  K/uL


 


Mono # (Auto)    0.8  (0.0-0.8)  K/uL


 


Eos # (Auto)    0.0  (0.0-0.7)  K/uL


 


Baso # (Auto)    0.0  (0.0-0.1)  K/uL


 


Nucleated RBC %    0.0  /100WBC


 


Nucleated RBCs #    0  K/uL


 


INR     


 


APTT     (18.6-31.3)  SEC


 


Sodium     (136-148)  mmol/L


 


Potassium     (3.5-5.1)  mmol/L


 


Chloride     ()  mmol/L


 


Carbon Dioxide     (21.0-32.0)  mmol/L


 


BUN     (7.0-18.0)  mg/dL


 


Creatinine     (0.8-1.3)  mg/dL


 


Est Cr Clr Drug Dosing     mL/min


 


Estimated GFR (MDRD)     ml/min


 


Glucose     ()  mg/dL


 


Hemoglobin A1c   5.7  (4.5 - 6.2) %


 


Calcium     (8.5-10.1)  mg/dL


 


Magnesium     (1.8-2.4)  mg/dL


 


Total Bilirubin     (0.2-1.0)  mg/dL


 


AST     (15-37)  IU/L


 


ALT     (14-63)  IU/L


 


Alkaline Phosphatase     ()  U/L


 


Troponin I     (0.000-0.056)  ng/mL


 


Total Protein     (6.4-8.2)  g/dL


 


Albumin     (3.4-5.0)  g/dL


 


Globulin     (2.6-4.0)  g/dL


 


Albumin/Globulin Ratio     (0.9-1.6)  


 


Triglycerides     (0-200)  mg/dL


 


Cholesterol     ()  mg/dL


 


LDL Cholesterol, Calc     ()  mg/dL


 


VLDL Cholesterol     (5-55)  mg/dL


 


HDL Cholesterol     (40-60)  mg/dL


 


Cholesterol/HDL Ratio     (3.3-6.0)  


 


Lipase     ()  U/L


 


Free T4  1.34    (0.76-1.46)  ng/dL


 


Free T3  2.30    (2.18-3.98)  pg/mL


 


TSH 3rd Generation     (0.36-3.74)  uIU/mL


 


Urine Color     


 


Urine Appearance     


 


Urine pH     (5.0-8.0)  


 


Ur Specific Gravity     (1.001-1.035)  


 


Urine Protein     (NEGATIVE)  mg/dL


 


Urine Glucose (UA)     (NEGATIVE)  mg/dL


 


Urine Ketones     (NEGATIVE)  mg/dL


 


Urine Occult Blood     (NEGATIVE)  


 


Urine Nitrite     (NEGATIVE)  


 


Urine Bilirubin     (NEGATIVE)  


 


Urine Urobilinogen     (<2.0)  EU/dL


 


Ur Leukocyte Esterase     (NEGATIVE)  


 


Urine RBC     (0-2/HPF)  


 


Urine WBC     (0-5/HPF)  


 


Ur Epithelial Cells     (NONE-FEW)  


 


Urine Bacteria     (NEGATIVE)  


 


SARS-CoV-2 RNA (TEZ)     (NEGATIVE)  














  05/06/21 05/06/21 05/06/21 Range/Units





  08:25 08:25 09:37 


 


WBC     (4.0-11.0)  K/uL


 


RBC     (4.50-5.90)  M/uL


 


Hgb     (13.0-17.0)  g/dL


 


Hct     (38.0-50.0)  %


 


MCV     (80.0-98.0)  fL


 


MCH     (27.0-32.0)  pg


 


MCHC     (31.0-37.0)  g/dL


 


RDW Std Deviation     (28.0-62.0)  fl


 


RDW Coeff of Sumi     (11.0-15.0)  %


 


Plt Count     (150-400)  K/uL


 


MPV     (7.40-12.00)  fL


 


Neut % (Auto)     (48.0-80.0)  %


 


Lymph % (Auto)     (16.0-40.0)  %


 


Mono % (Auto)     (0.0-15.0)  %


 


Eos % (Auto)     (0.0-7.0)  %


 


Baso % (Auto)     (0.0-1.5)  %


 


Neut # (Auto)     (1.4-5.7)  K/uL


 


Lymph # (Auto)     (0.6-2.4)  K/uL


 


Mono # (Auto)     (0.0-0.8)  K/uL


 


Eos # (Auto)     (0.0-0.7)  K/uL


 


Baso # (Auto)     (0.0-0.1)  K/uL


 


Nucleated RBC %     /100WBC


 


Nucleated RBCs #     K/uL


 


INR  1.17    


 


APTT  25.5    (18.6-31.3)  SEC


 


Sodium   139   (136-148)  mmol/L


 


Potassium   3.5   (3.5-5.1)  mmol/L


 


Chloride   104   ()  mmol/L


 


Carbon Dioxide   23.6   (21.0-32.0)  mmol/L


 


BUN   15   (7.0-18.0)  mg/dL


 


Creatinine   1.0   (0.8-1.3)  mg/dL


 


Est Cr Clr Drug Dosing   76.49   mL/min


 


Estimated GFR (MDRD)   > 60.0   ml/min


 


Glucose   136 H   ()  mg/dL


 


Hemoglobin A1c    (4.5 - 6.2) %


 


Calcium   9.1   (8.5-10.1)  mg/dL


 


Magnesium     (1.8-2.4)  mg/dL


 


Total Bilirubin   0.4   (0.2-1.0)  mg/dL


 


AST   18   (15-37)  IU/L


 


ALT   25   (14-63)  IU/L


 


Alkaline Phosphatase   75   ()  U/L


 


Troponin I   < 0.050   (0.000-0.056)  ng/mL


 


Total Protein   7.5   (6.4-8.2)  g/dL


 


Albumin   3.8   (3.4-5.0)  g/dL


 


Globulin   3.7   (2.6-4.0)  g/dL


 


Albumin/Globulin Ratio   1.0   (0.9-1.6)  


 


Triglycerides     (0-200)  mg/dL


 


Cholesterol     ()  mg/dL


 


LDL Cholesterol, Calc     ()  mg/dL


 


VLDL Cholesterol     (5-55)  mg/dL


 


HDL Cholesterol     (40-60)  mg/dL


 


Cholesterol/HDL Ratio     (3.3-6.0)  


 


Lipase   75   ()  U/L


 


Free T4     (0.76-1.46)  ng/dL


 


Free T3     (2.18-3.98)  pg/mL


 


TSH 3rd Generation   0.01 L   (0.36-3.74)  uIU/mL


 


Urine Color     


 


Urine Appearance     


 


Urine pH     (5.0-8.0)  


 


Ur Specific Gravity     (1.001-1.035)  


 


Urine Protein     (NEGATIVE)  mg/dL


 


Urine Glucose (UA)     (NEGATIVE)  mg/dL


 


Urine Ketones     (NEGATIVE)  mg/dL


 


Urine Occult Blood     (NEGATIVE)  


 


Urine Nitrite     (NEGATIVE)  


 


Urine Bilirubin     (NEGATIVE)  


 


Urine Urobilinogen     (<2.0)  EU/dL


 


Ur Leukocyte Esterase     (NEGATIVE)  


 


Urine RBC     (0-2/HPF)  


 


Urine WBC     (0-5/HPF)  


 


Ur Epithelial Cells     (NONE-FEW)  


 


Urine Bacteria     (NEGATIVE)  


 


SARS-CoV-2 RNA (TEZ)    NEGATIVE  (NEGATIVE)  














  05/06/21 05/07/21 05/07/21 Range/Units





  17:40 05:15 05:15 


 


WBC   14.22 H   (4.0-11.0)  K/uL


 


RBC   4.91   (4.50-5.90)  M/uL


 


Hgb   14.2   (13.0-17.0)  g/dL


 


Hct   41.6   (38.0-50.0)  %


 


MCV   84.7   (80.0-98.0)  fL


 


MCH   28.9   (27.0-32.0)  pg


 


MCHC   34.1   (31.0-37.0)  g/dL


 


RDW Std Deviation   45.1   (28.0-62.0)  fl


 


RDW Coeff of Sumi   15   (11.0-15.0)  %


 


Plt Count   187   (150-400)  K/uL


 


MPV   11.40   (7.40-12.00)  fL


 


Neut % (Auto)   74.6   (48.0-80.0)  %


 


Lymph % (Auto)   17.5   (16.0-40.0)  %


 


Mono % (Auto)   7.2   (0.0-15.0)  %


 


Eos % (Auto)   0.6   (0.0-7.0)  %


 


Baso % (Auto)   0.1   (0.0-1.5)  %


 


Neut # (Auto)   10.6 H   (1.4-5.7)  K/uL


 


Lymph # (Auto)   2.5 H   (0.6-2.4)  K/uL


 


Mono # (Auto)   1.0 H   (0.0-0.8)  K/uL


 


Eos # (Auto)   0.1   (0.0-0.7)  K/uL


 


Baso # (Auto)   0.0   (0.0-0.1)  K/uL


 


Nucleated RBC %   0.0   /100WBC


 


Nucleated RBCs #   0   K/uL


 


INR     


 


APTT     (18.6-31.3)  SEC


 


Sodium    138  (136-148)  mmol/L


 


Potassium    3.7  (3.5-5.1)  mmol/L


 


Chloride    105  ()  mmol/L


 


Carbon Dioxide    24.9  (21.0-32.0)  mmol/L


 


BUN    13  (7.0-18.0)  mg/dL


 


Creatinine    0.9  (0.8-1.3)  mg/dL


 


Est Cr Clr Drug Dosing    80.23  mL/min


 


Estimated GFR (MDRD)    > 60.0  ml/min


 


Glucose    107 H  ()  mg/dL


 


Hemoglobin A1c    (4.5 - 6.2) %


 


Calcium    8.0 L  (8.5-10.1)  mg/dL


 


Magnesium    1.9  (1.8-2.4)  mg/dL


 


Total Bilirubin     (0.2-1.0)  mg/dL


 


AST     (15-37)  IU/L


 


ALT     (14-63)  IU/L


 


Alkaline Phosphatase     ()  U/L


 


Troponin I     (0.000-0.056)  ng/mL


 


Total Protein     (6.4-8.2)  g/dL


 


Albumin     (3.4-5.0)  g/dL


 


Globulin     (2.6-4.0)  g/dL


 


Albumin/Globulin Ratio     (0.9-1.6)  


 


Triglycerides    120  (0-200)  mg/dL


 


Cholesterol    168  ()  mg/dL


 


LDL Cholesterol, Calc    120  ()  mg/dL


 


VLDL Cholesterol    24  (5-55)  mg/dL


 


HDL Cholesterol    24 L  (40-60)  mg/dL


 


Cholesterol/HDL Ratio    7.0 H  (3.3-6.0)  


 


Lipase     ()  U/L


 


Free T4     (0.76-1.46)  ng/dL


 


Free T3     (2.18-3.98)  pg/mL


 


TSH 3rd Generation     (0.36-3.74)  uIU/mL


 


Urine Color  YELLOW    


 


Urine Appearance  HAZY    


 


Urine pH  6.5    (5.0-8.0)  


 


Ur Specific Gravity  1.020    (1.001-1.035)  


 


Urine Protein  NEGATIVE    (NEGATIVE)  mg/dL


 


Urine Glucose (UA)  NEGATIVE    (NEGATIVE)  mg/dL


 


Urine Ketones  15 H    (NEGATIVE)  mg/dL


 


Urine Occult Blood  TRACE-INTACT H    (NEGATIVE)  


 


Urine Nitrite  NEGATIVE    (NEGATIVE)  


 


Urine Bilirubin  NEGATIVE    (NEGATIVE)  


 


Urine Urobilinogen  0.2    (<2.0)  EU/dL


 


Ur Leukocyte Esterase  NEGATIVE    (NEGATIVE)  


 


Urine RBC  0-3    (0-2/HPF)  


 


Urine WBC  0-2    (0-5/HPF)  


 


Ur Epithelial Cells  RARE    (NONE-FEW)  


 


Urine Bacteria  FEW    (NEGATIVE)  


 


SARS-CoV-2 RNA (TEZ)     (NEGATIVE)  











Med Orders - Current: 


                               Current Medications





Acetaminophen (Acetaminophen 325 Mg Tab)  650 mg PO Q4H PRN


   PRN Reason: Pain (Mild 1-3)/fever


   Last Admin: 05/07/21 08:01 Dose:  650 mg


   Documented by: 


Aspirin (Aspirin 325 Mg Tab.Ec)  325 mg PO DAILY [facility]


   Last Admin: 05/07/21 08:05 Dose:  325 mg


   Documented by: 


Atorvastatin Calcium (Atorvastatin 40 Mg Tab)  80 mg PO BEDTIME [facility]


   Last Admin: 05/06/21 20:40 Dose:  80 mg


   Documented by: 


Docusate Sodium (Docusate Sodium 100 Mg Cap)  100 mg PO DAILY [facility]


   Last Admin: 05/07/21 08:00 Dose:  100 mg


   Documented by: 


Labetalol HCl (Labetalol 100 Mg/20 Ml Mdv)  20 mg IVPUSH Q4H PRN


   PRN Reason: BP over 180/105


Multivitamins/Minerals/Vitamin C (Multivitamin Tab)  1 tab PO DAILY [facility]


   Last Admin: 05/07/21 08:00 Dose:  1 tab


   Documented by: 


Ondansetron HCl (Ondansetron 4 Mg/2 Ml Sdv)  4 mg IVPUSH Q4H PRN


   PRN Reason: Nausea


Sodium Chloride (Sodium Chloride 0.9% 2.5 Ml Syringe)  2.5 ml FLUSH ASDIRECTED 

PRN


   PRN Reason: Keep Vein Open





Discontinued Medications





Aspirin (Aspirin 325 Mg Tab)  325 mg PO ONETIME ONE


   Stop: 05/06/21 09:58


   Last Admin: 05/06/21 10:07 Dose:  325 mg


   Documented by: 


Sodium Chloride (Normal Saline)  1,000 mls @ 125 mls/hr IV NOW STA


   Stop: 05/06/21 16:33


   Last Admin: 05/06/21 09:15 Dose:  125 mls/hr


   Documented by: 


Sodium Chloride (Normal Saline)  1,000 mls @ 999 mls/hr IV BOLUS ONE


   Stop: 05/06/21 09:34


   Last Admin: 05/06/21 09:15 Dose:  999 mls/hr


   Documented by: 


Iopamidol (Iopamidol 755 Mg/Ml 500 Ml Multipack Bottle)  100 ml IVPUSH ONETIME 

STA


   Stop: 05/06/21 10:09


   Last Admin: 05/06/21 10:09 Dose:  100 ml


   Documented by: 


Ondansetron HCl (Ondansetron 4 Mg/2 Ml Sdv)  4 mg IVPUSH ONETIME ONE


   Stop: 05/06/21 08:37


   Last Admin: 05/06/21 09:15 Dose:  4 mg


   Documented by: 


Sodium Chloride (Sodium Chloride 0.9% 2.5 Ml Syringe)  2.5 ml FLUSH ASDIRECTED 

PRN


   PRN Reason: Keep Vein Open


Sodium Chloride (Sodium Chloride 0.9% 10 Ml Syringe)  10 ml FLUSH ASDIRECTED PRN


   PRN Reason: Keep Vein Open


   Last Admin: 05/06/21 09:15 Dose:  10 ml


   Documented by: 


Sodium Chloride (Sodium Chloride 0.9% 2.5 Ml Syringe)  2.5 ml FLUSH ASDIRECTED 

PRN


   PRN Reason: Keep Vein Open


   Last Admin: 05/06/21 09:15 Dose:  2.5 ml


   Documented by: 


Sodium Chloride (Sodium Chloride 0.9% 10 Ml Sdv)  10 ml IV ASDIRECTED PRN


   PRN Reason: IV Use











- Exam


General: Alert, Oriented, Cooperative, No Acute Distress


HEENT: Pupils Equal, Pupils Reactive, Other (Marked deviation of gaze to the 

left has improved significantly.  Able to move eyes to the right much better 

today)


Lungs: Clear to Auscultation, Normal Respiratory Effort


Cardiovascular: Regular Rate, Regular Rhythm, No Murmurs


GI/Abdominal Exam: Normal Bowel Sounds, Soft, Non-Tender


Back Exam: Normal Inspection, Full Range of Motion


Extremities: Normal Inspection, Normal Range of Motion, Non-Tender, No Pedal 

Edema


Wound/Incisions: Healing Well


Neurological: Other (Continues to have nystagmus in all directions.  Marked 

deviation of gaze to the left has improved.).  No: Normal Speech (Speech still 

depressed and slurred at times.)


Psy/Mental Status: Alert, Normal Affect, Normal Mood





- Patient Data


Lab Results Last 24 hrs: 


                         Laboratory Results - last 24 hr











  05/06/21 05/06/21 05/06/21 Range/Units





  08:24 08:24 08:25 


 


WBC    15.72 H  (4.0-11.0)  K/uL


 


RBC    5.35  (4.50-5.90)  M/uL


 


Hgb    15.7  (13.0-17.0)  g/dL


 


Hct    45.3  (38.0-50.0)  %


 


MCV    84.7  (80.0-98.0)  fL


 


MCH    29.3  (27.0-32.0)  pg


 


MCHC    34.7  (31.0-37.0)  g/dL


 


RDW Std Deviation    44.9  (28.0-62.0)  fl


 


RDW Coeff of Sumi    15  (11.0-15.0)  %


 


Plt Count    215  (150-400)  K/uL


 


MPV    11.00  (7.40-12.00)  fL


 


Neut % (Auto)    84.9 H  (48.0-80.0)  %


 


Lymph % (Auto)    10.0 L  (16.0-40.0)  %


 


Mono % (Auto)    4.9  (0.0-15.0)  %


 


Eos % (Auto)    0.1  (0.0-7.0)  %


 


Baso % (Auto)    0.1  (0.0-1.5)  %


 


Neut # (Auto)    13.4 H  (1.4-5.7)  K/uL


 


Lymph # (Auto)    1.6  (0.6-2.4)  K/uL


 


Mono # (Auto)    0.8  (0.0-0.8)  K/uL


 


Eos # (Auto)    0.0  (0.0-0.7)  K/uL


 


Baso # (Auto)    0.0  (0.0-0.1)  K/uL


 


Nucleated RBC %    0.0  /100WBC


 


Nucleated RBCs #    0  K/uL


 


INR     


 


APTT     (18.6-31.3)  SEC


 


Sodium     (136-148)  mmol/L


 


Potassium     (3.5-5.1)  mmol/L


 


Chloride     ()  mmol/L


 


Carbon Dioxide     (21.0-32.0)  mmol/L


 


BUN     (7.0-18.0)  mg/dL


 


Creatinine     (0.8-1.3)  mg/dL


 


Est Cr Clr Drug Dosing     mL/min


 


Estimated GFR (MDRD)     ml/min


 


Glucose     ()  mg/dL


 


Hemoglobin A1c   5.7  (4.5 - 6.2) %


 


Calcium     (8.5-10.1)  mg/dL


 


Magnesium     (1.8-2.4)  mg/dL


 


Total Bilirubin     (0.2-1.0)  mg/dL


 


AST     (15-37)  IU/L


 


ALT     (14-63)  IU/L


 


Alkaline Phosphatase     ()  U/L


 


Troponin I     (0.000-0.056)  ng/mL


 


Total Protein     (6.4-8.2)  g/dL


 


Albumin     (3.4-5.0)  g/dL


 


Globulin     (2.6-4.0)  g/dL


 


Albumin/Globulin Ratio     (0.9-1.6)  


 


Triglycerides     (0-200)  mg/dL


 


Cholesterol     ()  mg/dL


 


LDL Cholesterol, Calc     ()  mg/dL


 


VLDL Cholesterol     (5-55)  mg/dL


 


HDL Cholesterol     (40-60)  mg/dL


 


Cholesterol/HDL Ratio     (3.3-6.0)  


 


Lipase     ()  U/L


 


Free T4  1.34    (0.76-1.46)  ng/dL


 


Free T3  2.30    (2.18-3.98)  pg/mL


 


TSH 3rd Generation     (0.36-3.74)  uIU/mL


 


Urine Color     


 


Urine Appearance     


 


Urine pH     (5.0-8.0)  


 


Ur Specific Gravity     (1.001-1.035)  


 


Urine Protein     (NEGATIVE)  mg/dL


 


Urine Glucose (UA)     (NEGATIVE)  mg/dL


 


Urine Ketones     (NEGATIVE)  mg/dL


 


Urine Occult Blood     (NEGATIVE)  


 


Urine Nitrite     (NEGATIVE)  


 


Urine Bilirubin     (NEGATIVE)  


 


Urine Urobilinogen     (<2.0)  EU/dL


 


Ur Leukocyte Esterase     (NEGATIVE)  


 


Urine RBC     (0-2/HPF)  


 


Urine WBC     (0-5/HPF)  


 


Ur Epithelial Cells     (NONE-FEW)  


 


Urine Bacteria     (NEGATIVE)  


 


SARS-CoV-2 RNA (TEZ)     (NEGATIVE)  














  05/06/21 05/06/21 05/06/21 Range/Units





  08:25 08:25 09:37 


 


WBC     (4.0-11.0)  K/uL


 


RBC     (4.50-5.90)  M/uL


 


Hgb     (13.0-17.0)  g/dL


 


Hct     (38.0-50.0)  %


 


MCV     (80.0-98.0)  fL


 


MCH     (27.0-32.0)  pg


 


MCHC     (31.0-37.0)  g/dL


 


RDW Std Deviation     (28.0-62.0)  fl


 


RDW Coeff of Sumi     (11.0-15.0)  %


 


Plt Count     (150-400)  K/uL


 


MPV     (7.40-12.00)  fL


 


Neut % (Auto)     (48.0-80.0)  %


 


Lymph % (Auto)     (16.0-40.0)  %


 


Mono % (Auto)     (0.0-15.0)  %


 


Eos % (Auto)     (0.0-7.0)  %


 


Baso % (Auto)     (0.0-1.5)  %


 


Neut # (Auto)     (1.4-5.7)  K/uL


 


Lymph # (Auto)     (0.6-2.4)  K/uL


 


Mono # (Auto)     (0.0-0.8)  K/uL


 


Eos # (Auto)     (0.0-0.7)  K/uL


 


Baso # (Auto)     (0.0-0.1)  K/uL


 


Nucleated RBC %     /100WBC


 


Nucleated RBCs #     K/uL


 


INR  1.17    


 


APTT  25.5    (18.6-31.3)  SEC


 


Sodium   139   (136-148)  mmol/L


 


Potassium   3.5   (3.5-5.1)  mmol/L


 


Chloride   104   ()  mmol/L


 


Carbon Dioxide   23.6   (21.0-32.0)  mmol/L


 


BUN   15   (7.0-18.0)  mg/dL


 


Creatinine   1.0   (0.8-1.3)  mg/dL


 


Est Cr Clr Drug Dosing   76.49   mL/min


 


Estimated GFR (MDRD)   > 60.0   ml/min


 


Glucose   136 H   ()  mg/dL


 


Hemoglobin A1c    (4.5 - 6.2) %


 


Calcium   9.1   (8.5-10.1)  mg/dL


 


Magnesium     (1.8-2.4)  mg/dL


 


Total Bilirubin   0.4   (0.2-1.0)  mg/dL


 


AST   18   (15-37)  IU/L


 


ALT   25   (14-63)  IU/L


 


Alkaline Phosphatase   75   ()  U/L


 


Troponin I   < 0.050   (0.000-0.056)  ng/mL


 


Total Protein   7.5   (6.4-8.2)  g/dL


 


Albumin   3.8   (3.4-5.0)  g/dL


 


Globulin   3.7   (2.6-4.0)  g/dL


 


Albumin/Globulin Ratio   1.0   (0.9-1.6)  


 


Triglycerides     (0-200)  mg/dL


 


Cholesterol     ()  mg/dL


 


LDL Cholesterol, Calc     ()  mg/dL


 


VLDL Cholesterol     (5-55)  mg/dL


 


HDL Cholesterol     (40-60)  mg/dL


 


Cholesterol/HDL Ratio     (3.3-6.0)  


 


Lipase   75   ()  U/L


 


Free T4     (0.76-1.46)  ng/dL


 


Free T3     (2.18-3.98)  pg/mL


 


TSH 3rd Generation   0.01 L   (0.36-3.74)  uIU/mL


 


Urine Color     


 


Urine Appearance     


 


Urine pH     (5.0-8.0)  


 


Ur Specific Gravity     (1.001-1.035)  


 


Urine Protein     (NEGATIVE)  mg/dL


 


Urine Glucose (UA)     (NEGATIVE)  mg/dL


 


Urine Ketones     (NEGATIVE)  mg/dL


 


Urine Occult Blood     (NEGATIVE)  


 


Urine Nitrite     (NEGATIVE)  


 


Urine Bilirubin     (NEGATIVE)  


 


Urine Urobilinogen     (<2.0)  EU/dL


 


Ur Leukocyte Esterase     (NEGATIVE)  


 


Urine RBC     (0-2/HPF)  


 


Urine WBC     (0-5/HPF)  


 


Ur Epithelial Cells     (NONE-FEW)  


 


Urine Bacteria     (NEGATIVE)  


 


SARS-CoV-2 RNA (TEZ)    NEGATIVE  (NEGATIVE)  














  05/06/21 05/07/21 05/07/21 Range/Units





  17:40 05:15 05:15 


 


WBC   14.22 H   (4.0-11.0)  K/uL


 


RBC   4.91   (4.50-5.90)  M/uL


 


Hgb   14.2   (13.0-17.0)  g/dL


 


Hct   41.6   (38.0-50.0)  %


 


MCV   84.7   (80.0-98.0)  fL


 


MCH   28.9   (27.0-32.0)  pg


 


MCHC   34.1   (31.0-37.0)  g/dL


 


RDW Std Deviation   45.1   (28.0-62.0)  fl


 


RDW Coeff of Sumi   15   (11.0-15.0)  %


 


Plt Count   187   (150-400)  K/uL


 


MPV   11.40   (7.40-12.00)  fL


 


Neut % (Auto)   74.6   (48.0-80.0)  %


 


Lymph % (Auto)   17.5   (16.0-40.0)  %


 


Mono % (Auto)   7.2   (0.0-15.0)  %


 


Eos % (Auto)   0.6   (0.0-7.0)  %


 


Baso % (Auto)   0.1   (0.0-1.5)  %


 


Neut # (Auto)   10.6 H   (1.4-5.7)  K/uL


 


Lymph # (Auto)   2.5 H   (0.6-2.4)  K/uL


 


Mono # (Auto)   1.0 H   (0.0-0.8)  K/uL


 


Eos # (Auto)   0.1   (0.0-0.7)  K/uL


 


Baso # (Auto)   0.0   (0.0-0.1)  K/uL


 


Nucleated RBC %   0.0   /100WBC


 


Nucleated RBCs #   0   K/uL


 


INR     


 


APTT     (18.6-31.3)  SEC


 


Sodium    138  (136-148)  mmol/L


 


Potassium    3.7  (3.5-5.1)  mmol/L


 


Chloride    105  ()  mmol/L


 


Carbon Dioxide    24.9  (21.0-32.0)  mmol/L


 


BUN    13  (7.0-18.0)  mg/dL


 


Creatinine    0.9  (0.8-1.3)  mg/dL


 


Est Cr Clr Drug Dosing    80.23  mL/min


 


Estimated GFR (MDRD)    > 60.0  ml/min


 


Glucose    107 H  ()  mg/dL


 


Hemoglobin A1c    (4.5 - 6.2) %


 


Calcium    8.0 L  (8.5-10.1)  mg/dL


 


Magnesium    1.9  (1.8-2.4)  mg/dL


 


Total Bilirubin     (0.2-1.0)  mg/dL


 


AST     (15-37)  IU/L


 


ALT     (14-63)  IU/L


 


Alkaline Phosphatase     ()  U/L


 


Troponin I     (0.000-0.056)  ng/mL


 


Total Protein     (6.4-8.2)  g/dL


 


Albumin     (3.4-5.0)  g/dL


 


Globulin     (2.6-4.0)  g/dL


 


Albumin/Globulin Ratio     (0.9-1.6)  


 


Triglycerides    120  (0-200)  mg/dL


 


Cholesterol    168  ()  mg/dL


 


LDL Cholesterol, Calc    120  ()  mg/dL


 


VLDL Cholesterol    24  (5-55)  mg/dL


 


HDL Cholesterol    24 L  (40-60)  mg/dL


 


Cholesterol/HDL Ratio    7.0 H  (3.3-6.0)  


 


Lipase     ()  U/L


 


Free T4     (0.76-1.46)  ng/dL


 


Free T3     (2.18-3.98)  pg/mL


 


TSH 3rd Generation     (0.36-3.74)  uIU/mL


 


Urine Color  YELLOW    


 


Urine Appearance  HAZY    


 


Urine pH  6.5    (5.0-8.0)  


 


Ur Specific Gravity  1.020    (1.001-1.035)  


 


Urine Protein  NEGATIVE    (NEGATIVE)  mg/dL


 


Urine Glucose (UA)  NEGATIVE    (NEGATIVE)  mg/dL


 


Urine Ketones  15 H    (NEGATIVE)  mg/dL


 


Urine Occult Blood  TRACE-INTACT H    (NEGATIVE)  


 


Urine Nitrite  NEGATIVE    (NEGATIVE)  


 


Urine Bilirubin  NEGATIVE    (NEGATIVE)  


 


Urine Urobilinogen  0.2    (<2.0)  EU/dL


 


Ur Leukocyte Esterase  NEGATIVE    (NEGATIVE)  


 


Urine RBC  0-3    (0-2/HPF)  


 


Urine WBC  0-2    (0-5/HPF)  


 


Ur Epithelial Cells  RARE    (NONE-FEW)  


 


Urine Bacteria  FEW    (NEGATIVE)  


 


SARS-CoV-2 RNA (TEZ)     (NEGATIVE)  











Result Diagrams: 


                                 05/07/21 05:15





                                 05/07/21 05:15





Sepsis Event Note





- Evaluation


Sepsis Screening Result: No Definite Risk





- Focused Exam


Vital Signs: 


                                   Vital Signs











  Temp Pulse Resp BP Pulse Ox


 


 05/07/21 03:29  98.6 F  66  19  137/85  97


 


 05/07/21 00:00  99.2 F  68  14  128/79  95














- Problem List & Annotations


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


SNOMED Code(s): 778853880


   Code(s): I63.9 - CEREBRAL INFARCTION, UNSPECIFIED; I77.70 - DISSECTION OF 

UNSPECIFIED ARTERY   Status: Acute   Current Visit: Yes   





- Problem List Review


Problem List Initiated/Reviewed/Updated: Yes





- My Orders


Last 24 Hours: 


My Active Orders





05/06/21 Lunch


Heart Healthy Diet [DIET] 





05/06/21 12:02


Intake and Output [RC] Q12H 


Oxygen Therapy [RC] PRN 


Up With Assistance [RC] ASDIRECTED 


VTE/DVT Education [RC] PER UNIT ROUTINE 


Vital Signs [RC] Q4H 


Acetaminophen [TylenoL]   650 mg PO Q4H PRN 


Ondansetron [Zofran]   4 mg IVPUSH Q4H PRN 


Sodium Chloride 0.9% [Saline Flush]   2.5 ml FLUSH ASDIRECTED PRN 


Anticoagulation Contraindications VTE [AST] Per Unit Routine 


Saline Lock Insert [OM.PC] Routine 





05/06/21 12:03


Antiembolic Devices [RC] PER UNIT ROUTINE 


Sequential Compression Device [OM.PC] Per Unit Routine 





05/06/21 12:10


NIH Stroke Scale [RC] Q4H 





05/06/21 12:26


Echo Comp wo Cont [US] Urgent 





05/06/21 12:52


OT Evaluation and Treatment [CONS] Routine 


PT Evaluation and Treatment [CONS] Routine 





05/06/21 12:54


Telemetry Monitoring [Cardiac Monitoring] [RC] Q8H 


Labetalol [Normodyne]   20 mg IVPUSH Q4H PRN 





05/06/21 13:06


Communication Order [RC] PRN 





05/06/21 13:08


Resuscitation Status Routine 





05/06/21 21:00


atorvaSTATin [Lipitor]   80 mg PO BEDTIME 





05/07/21 09:00


Aspirin [Ecotrin]   325 mg PO DAILY 


Docusate Sodium [Colace]   100 mg PO DAILY 


Multivitamins [Tab-A-Burt]   1 tab PO DAILY 














- Plan


Plan:: 





This 75-year-old male admitted with acute Ischemic stroke due to vertebral 

artery dissection





1.  Acute ischemic stroke


-Appreciate Dr. Pulido's consultation and recommendations.


-Brain without reveals extensive acute infarct of right cerebellum, bilateral 

occipital infarcts, punctate hemorrhage in the left occipital infarct


-Echo echo results returned this afternoon.  EF 60 to 65%, normal left 

ventricular systolic function, mildly increased left ventricular posterior wall 

thickness, normal pattern of LV diastolic filling, normal right ventricular 

size, wall thickness, and systolic function.  There is mild aortic valve 

sclerosis without stenosis.  Trace MR.  Right ventricular systolic pressure is 

mildly to moderately elevated at 36.6 mmHg no regional wall abnormalities noted.


-Monitor on telemetry for any arrhythmias


-Neuro and NIH scales every 4 hours 


-Continue aspirin 325 mg daily for 10 to 14 days then consider transition to 

anticoagulation with warfarin or NOAC


-Continue atorvastatin 80 


-A1c 5.7


-TSH 0.01 consider reevaluation of this as an outpatient.


-Permissive hypertension up until blood pressures of 180/105 we will order 

labetalol as needed, blood pressures have been fairly stable.


-Consult PT OT to evaluate and treat, speech therapy unavailable at this time 

speech therapy available Monday.


-Hold any type of anticoagulation as transformation to hemorrhagic stroke as 

possible and would be catastrophic.


-Arrange follow-up with Dr. Pulido as well as PCP.


-Recheck labwork in am, leukocytosis could be reactive from CVA.  UA negative








VTE prophylaxis: SCDs only due to risk of transformation to hemorrhagic stroke.





CODE STATUS: DNR/DNI per discussion with patient





Dispo: We will need monitoring for the next 2 to 3 days.  Consider short-term 

rehabilitation at discharge.

## 2021-05-07 NOTE — CT
INDICATION:



Recent CVA. Headache.



TECHNIQUE:



CT head without contrast.



COMPARISON:



CT and MRI head May 6, 2021. 



FINDINGS:



CSF spaces: Within normal limits for age.  



Brain parenchyma and extra-axial spaces: No change in appearance of the 

right cerebellar infarct. No new or enlarging area of ischemia. No acute 

intracranial hemorrhage. No significant mass effect or midline shift 



Skull base and calvarium: The visualized paranasal sinuses and mastoid air 

cells demonstrate no acute or significant findings.  The visualized orbits 

are grossly unremarkable.  No skull fractures.  



IMPRESSION:



No change from yesterday`s exam. Stable right cerebellar infarct. No sign 

of hemorrhagic transformation or other new abnormality.



Please note that all CT scans at this facility use dose modulation, 

iterative reconstruction, and/or weight-based dosing when appropriate to 

reduce radiation dose to as low as reasonably achievable.



Dictated by Jonh Beaulieu MD @ 5/7/2021 4:05:44 PM



Signed by Dr. Jonh Beaulieu @ May  7 2021  4:05PM

## 2021-05-07 NOTE — PCM.CONSN
- General Info


Date of Service: 05/07/21


Subjective Update: 





At about noon on May 5th,, he developed nausea, vomiting and diarrhea.  He was 

up vomiting throughout the night and noted dizziness, vertigo imbalance, left 

upper limb numbness and clumsiness during the night.    He presented to ED 

morning of May 6th noted to have ataxia, nystagmus, appendicular dysmetria.  CT 

head showed right cerebellar stroke.  CTA showed occlusion of the vertebral 

artery characteristic of dissection. He reports improvement in his vision today.







- Patient Data


Vitals - Most Recent: 


                                Last Vital Signs











Temp  37.1 C   05/07/21 08:00


 


Pulse  69   05/07/21 08:00


 


Resp  18   05/07/21 08:00


 


BP  148/78 H  05/07/21 08:00


 


Pulse Ox  97   05/07/21 08:00











Weight - Most Recent: 95.118 kg


I&O - Last 24 Hours: 


                                 Intake & Output











 05/06/21 05/07/21 05/07/21





 22:59 06:59 14:59


 


Intake Total 1139 1200 


 


Output Total  1950 


 


Balance 1139 -750 











Lab Results Last 24 Hours: 


                         Laboratory Results - last 24 hr











  05/06/21 05/06/21 05/06/21 Range/Units





  08:24 08:24 08:25 


 


WBC    15.72 H  (4.0-11.0)  K/uL


 


RBC    5.35  (4.50-5.90)  M/uL


 


Hgb    15.7  (13.0-17.0)  g/dL


 


Hct    45.3  (38.0-50.0)  %


 


MCV    84.7  (80.0-98.0)  fL


 


MCH    29.3  (27.0-32.0)  pg


 


MCHC    34.7  (31.0-37.0)  g/dL


 


RDW Std Deviation    44.9  (28.0-62.0)  fl


 


RDW Coeff of Sumi    15  (11.0-15.0)  %


 


Plt Count    215  (150-400)  K/uL


 


MPV    11.00  (7.40-12.00)  fL


 


Neut % (Auto)    84.9 H  (48.0-80.0)  %


 


Lymph % (Auto)    10.0 L  (16.0-40.0)  %


 


Mono % (Auto)    4.9  (0.0-15.0)  %


 


Eos % (Auto)    0.1  (0.0-7.0)  %


 


Baso % (Auto)    0.1  (0.0-1.5)  %


 


Neut # (Auto)    13.4 H  (1.4-5.7)  K/uL


 


Lymph # (Auto)    1.6  (0.6-2.4)  K/uL


 


Mono # (Auto)    0.8  (0.0-0.8)  K/uL


 


Eos # (Auto)    0.0  (0.0-0.7)  K/uL


 


Baso # (Auto)    0.0  (0.0-0.1)  K/uL


 


Nucleated RBC %    0.0  /100WBC


 


Nucleated RBCs #    0  K/uL


 


INR     


 


APTT     (18.6-31.3)  SEC


 


Sodium     (136-148)  mmol/L


 


Potassium     (3.5-5.1)  mmol/L


 


Chloride     ()  mmol/L


 


Carbon Dioxide     (21.0-32.0)  mmol/L


 


BUN     (7.0-18.0)  mg/dL


 


Creatinine     (0.8-1.3)  mg/dL


 


Est Cr Clr Drug Dosing     mL/min


 


Estimated GFR (MDRD)     ml/min


 


Glucose     ()  mg/dL


 


Hemoglobin A1c   5.7  (4.5 - 6.2) %


 


Calcium     (8.5-10.1)  mg/dL


 


Magnesium     (1.8-2.4)  mg/dL


 


Total Bilirubin     (0.2-1.0)  mg/dL


 


AST     (15-37)  IU/L


 


ALT     (14-63)  IU/L


 


Alkaline Phosphatase     ()  U/L


 


Troponin I     (0.000-0.056)  ng/mL


 


Total Protein     (6.4-8.2)  g/dL


 


Albumin     (3.4-5.0)  g/dL


 


Globulin     (2.6-4.0)  g/dL


 


Albumin/Globulin Ratio     (0.9-1.6)  


 


Triglycerides     (0-200)  mg/dL


 


Cholesterol     ()  mg/dL


 


LDL Cholesterol, Calc     ()  mg/dL


 


VLDL Cholesterol     (5-55)  mg/dL


 


HDL Cholesterol     (40-60)  mg/dL


 


Cholesterol/HDL Ratio     (3.3-6.0)  


 


Lipase     ()  U/L


 


Free T4  1.34    (0.76-1.46)  ng/dL


 


Free T3  2.30    (2.18-3.98)  pg/mL


 


TSH 3rd Generation     (0.36-3.74)  uIU/mL


 


Urine Color     


 


Urine Appearance     


 


Urine pH     (5.0-8.0)  


 


Ur Specific Gravity     (1.001-1.035)  


 


Urine Protein     (NEGATIVE)  mg/dL


 


Urine Glucose (UA)     (NEGATIVE)  mg/dL


 


Urine Ketones     (NEGATIVE)  mg/dL


 


Urine Occult Blood     (NEGATIVE)  


 


Urine Nitrite     (NEGATIVE)  


 


Urine Bilirubin     (NEGATIVE)  


 


Urine Urobilinogen     (<2.0)  EU/dL


 


Ur Leukocyte Esterase     (NEGATIVE)  


 


Urine RBC     (0-2/HPF)  


 


Urine WBC     (0-5/HPF)  


 


Ur Epithelial Cells     (NONE-FEW)  


 


Urine Bacteria     (NEGATIVE)  


 


SARS-CoV-2 RNA (TEZ)     (NEGATIVE)  














  05/06/21 05/06/21 05/06/21 Range/Units





  08:25 08:25 09:37 


 


WBC     (4.0-11.0)  K/uL


 


RBC     (4.50-5.90)  M/uL


 


Hgb     (13.0-17.0)  g/dL


 


Hct     (38.0-50.0)  %


 


MCV     (80.0-98.0)  fL


 


MCH     (27.0-32.0)  pg


 


MCHC     (31.0-37.0)  g/dL


 


RDW Std Deviation     (28.0-62.0)  fl


 


RDW Coeff of Sumi     (11.0-15.0)  %


 


Plt Count     (150-400)  K/uL


 


MPV     (7.40-12.00)  fL


 


Neut % (Auto)     (48.0-80.0)  %


 


Lymph % (Auto)     (16.0-40.0)  %


 


Mono % (Auto)     (0.0-15.0)  %


 


Eos % (Auto)     (0.0-7.0)  %


 


Baso % (Auto)     (0.0-1.5)  %


 


Neut # (Auto)     (1.4-5.7)  K/uL


 


Lymph # (Auto)     (0.6-2.4)  K/uL


 


Mono # (Auto)     (0.0-0.8)  K/uL


 


Eos # (Auto)     (0.0-0.7)  K/uL


 


Baso # (Auto)     (0.0-0.1)  K/uL


 


Nucleated RBC %     /100WBC


 


Nucleated RBCs #     K/uL


 


INR  1.17    


 


APTT  25.5    (18.6-31.3)  SEC


 


Sodium   139   (136-148)  mmol/L


 


Potassium   3.5   (3.5-5.1)  mmol/L


 


Chloride   104   ()  mmol/L


 


Carbon Dioxide   23.6   (21.0-32.0)  mmol/L


 


BUN   15   (7.0-18.0)  mg/dL


 


Creatinine   1.0   (0.8-1.3)  mg/dL


 


Est Cr Clr Drug Dosing   76.49   mL/min


 


Estimated GFR (MDRD)   > 60.0   ml/min


 


Glucose   136 H   ()  mg/dL


 


Hemoglobin A1c    (4.5 - 6.2) %


 


Calcium   9.1   (8.5-10.1)  mg/dL


 


Magnesium     (1.8-2.4)  mg/dL


 


Total Bilirubin   0.4   (0.2-1.0)  mg/dL


 


AST   18   (15-37)  IU/L


 


ALT   25   (14-63)  IU/L


 


Alkaline Phosphatase   75   ()  U/L


 


Troponin I   < 0.050   (0.000-0.056)  ng/mL


 


Total Protein   7.5   (6.4-8.2)  g/dL


 


Albumin   3.8   (3.4-5.0)  g/dL


 


Globulin   3.7   (2.6-4.0)  g/dL


 


Albumin/Globulin Ratio   1.0   (0.9-1.6)  


 


Triglycerides     (0-200)  mg/dL


 


Cholesterol     ()  mg/dL


 


LDL Cholesterol, Calc     ()  mg/dL


 


VLDL Cholesterol     (5-55)  mg/dL


 


HDL Cholesterol     (40-60)  mg/dL


 


Cholesterol/HDL Ratio     (3.3-6.0)  


 


Lipase   75   ()  U/L


 


Free T4     (0.76-1.46)  ng/dL


 


Free T3     (2.18-3.98)  pg/mL


 


TSH 3rd Generation   0.01 L   (0.36-3.74)  uIU/mL


 


Urine Color     


 


Urine Appearance     


 


Urine pH     (5.0-8.0)  


 


Ur Specific Gravity     (1.001-1.035)  


 


Urine Protein     (NEGATIVE)  mg/dL


 


Urine Glucose (UA)     (NEGATIVE)  mg/dL


 


Urine Ketones     (NEGATIVE)  mg/dL


 


Urine Occult Blood     (NEGATIVE)  


 


Urine Nitrite     (NEGATIVE)  


 


Urine Bilirubin     (NEGATIVE)  


 


Urine Urobilinogen     (<2.0)  EU/dL


 


Ur Leukocyte Esterase     (NEGATIVE)  


 


Urine RBC     (0-2/HPF)  


 


Urine WBC     (0-5/HPF)  


 


Ur Epithelial Cells     (NONE-FEW)  


 


Urine Bacteria     (NEGATIVE)  


 


SARS-CoV-2 RNA (TEZ)    NEGATIVE  (NEGATIVE)  














  05/06/21 05/07/21 05/07/21 Range/Units





  17:40 05:15 05:15 


 


WBC   14.22 H   (4.0-11.0)  K/uL


 


RBC   4.91   (4.50-5.90)  M/uL


 


Hgb   14.2   (13.0-17.0)  g/dL


 


Hct   41.6   (38.0-50.0)  %


 


MCV   84.7   (80.0-98.0)  fL


 


MCH   28.9   (27.0-32.0)  pg


 


MCHC   34.1   (31.0-37.0)  g/dL


 


RDW Std Deviation   45.1   (28.0-62.0)  fl


 


RDW Coeff of Sumi   15   (11.0-15.0)  %


 


Plt Count   187   (150-400)  K/uL


 


MPV   11.40   (7.40-12.00)  fL


 


Neut % (Auto)   74.6   (48.0-80.0)  %


 


Lymph % (Auto)   17.5   (16.0-40.0)  %


 


Mono % (Auto)   7.2   (0.0-15.0)  %


 


Eos % (Auto)   0.6   (0.0-7.0)  %


 


Baso % (Auto)   0.1   (0.0-1.5)  %


 


Neut # (Auto)   10.6 H   (1.4-5.7)  K/uL


 


Lymph # (Auto)   2.5 H   (0.6-2.4)  K/uL


 


Mono # (Auto)   1.0 H   (0.0-0.8)  K/uL


 


Eos # (Auto)   0.1   (0.0-0.7)  K/uL


 


Baso # (Auto)   0.0   (0.0-0.1)  K/uL


 


Nucleated RBC %   0.0   /100WBC


 


Nucleated RBCs #   0   K/uL


 


INR     


 


APTT     (18.6-31.3)  SEC


 


Sodium    138  (136-148)  mmol/L


 


Potassium    3.7  (3.5-5.1)  mmol/L


 


Chloride    105  ()  mmol/L


 


Carbon Dioxide    24.9  (21.0-32.0)  mmol/L


 


BUN    13  (7.0-18.0)  mg/dL


 


Creatinine    0.9  (0.8-1.3)  mg/dL


 


Est Cr Clr Drug Dosing    80.23  mL/min


 


Estimated GFR (MDRD)    > 60.0  ml/min


 


Glucose    107 H  ()  mg/dL


 


Hemoglobin A1c    (4.5 - 6.2) %


 


Calcium    8.0 L  (8.5-10.1)  mg/dL


 


Magnesium    1.9  (1.8-2.4)  mg/dL


 


Total Bilirubin     (0.2-1.0)  mg/dL


 


AST     (15-37)  IU/L


 


ALT     (14-63)  IU/L


 


Alkaline Phosphatase     ()  U/L


 


Troponin I     (0.000-0.056)  ng/mL


 


Total Protein     (6.4-8.2)  g/dL


 


Albumin     (3.4-5.0)  g/dL


 


Globulin     (2.6-4.0)  g/dL


 


Albumin/Globulin Ratio     (0.9-1.6)  


 


Triglycerides    120  (0-200)  mg/dL


 


Cholesterol    168  ()  mg/dL


 


LDL Cholesterol, Calc    120  ()  mg/dL


 


VLDL Cholesterol    24  (5-55)  mg/dL


 


HDL Cholesterol    24 L  (40-60)  mg/dL


 


Cholesterol/HDL Ratio    7.0 H  (3.3-6.0)  


 


Lipase     ()  U/L


 


Free T4     (0.76-1.46)  ng/dL


 


Free T3     (2.18-3.98)  pg/mL


 


TSH 3rd Generation     (0.36-3.74)  uIU/mL


 


Urine Color  YELLOW    


 


Urine Appearance  HAZY    


 


Urine pH  6.5    (5.0-8.0)  


 


Ur Specific Gravity  1.020    (1.001-1.035)  


 


Urine Protein  NEGATIVE    (NEGATIVE)  mg/dL


 


Urine Glucose (UA)  NEGATIVE    (NEGATIVE)  mg/dL


 


Urine Ketones  15 H    (NEGATIVE)  mg/dL


 


Urine Occult Blood  TRACE-INTACT H    (NEGATIVE)  


 


Urine Nitrite  NEGATIVE    (NEGATIVE)  


 


Urine Bilirubin  NEGATIVE    (NEGATIVE)  


 


Urine Urobilinogen  0.2    (<2.0)  EU/dL


 


Ur Leukocyte Esterase  NEGATIVE    (NEGATIVE)  


 


Urine RBC  0-3    (0-2/HPF)  


 


Urine WBC  0-2    (0-5/HPF)  


 


Ur Epithelial Cells  RARE    (NONE-FEW)  


 


Urine Bacteria  FEW    (NEGATIVE)  


 


SARS-CoV-2 RNA (TEZ)     (NEGATIVE)  











Med Orders - Current: 


                               Current Medications





Acetaminophen (Acetaminophen 325 Mg Tab)  650 mg PO Q4H PRN


   PRN Reason: Pain (Mild 1-3)/fever


   Last Admin: 05/07/21 08:01 Dose:  650 mg


   Documented by: 


Aspirin (Aspirin 325 Mg Tab.Ec)  325 mg PO DAILY [facility]


   Last Admin: 05/07/21 08:05 Dose:  325 mg


   Documented by: 


Atorvastatin Calcium (Atorvastatin 40 Mg Tab)  80 mg PO BEDTIME [facility]


   Last Admin: 05/06/21 20:40 Dose:  80 mg


   Documented by: 


Docusate Sodium (Docusate Sodium 100 Mg Cap)  100 mg PO DAILY [facility]


   Last Admin: 05/07/21 08:00 Dose:  100 mg


   Documented by: 


Labetalol HCl (Labetalol 100 Mg/20 Ml Mdv)  20 mg IVPUSH Q4H PRN


   PRN Reason: BP over 180/105


Multivitamins/Minerals/Vitamin C (Multivitamin Tab)  1 tab PO DAILY [facility]


   Last Admin: 05/07/21 08:00 Dose:  1 tab


   Documented by: 


Ondansetron HCl (Ondansetron 4 Mg/2 Ml Sdv)  4 mg IVPUSH Q4H PRN


   PRN Reason: Nausea


Sodium Chloride (Sodium Chloride 0.9% 2.5 Ml Syringe)  2.5 ml FLUSH ASDIRECTED 

PRN


   PRN Reason: Keep Vein Open





Discontinued Medications





Aspirin (Aspirin 325 Mg Tab)  325 mg PO ONETIME ONE


   Stop: 05/06/21 09:58


   Last Admin: 05/06/21 10:07 Dose:  325 mg


   Documented by: 


Sodium Chloride (Normal Saline)  1,000 mls @ 125 mls/hr IV NOW STA


   Stop: 05/06/21 16:33


   Last Admin: 05/06/21 09:15 Dose:  125 mls/hr


   Documented by: 


Sodium Chloride (Normal Saline)  1,000 mls @ 999 mls/hr IV BOLUS ONE


   Stop: 05/06/21 09:34


   Last Admin: 05/06/21 09:15 Dose:  999 mls/hr


   Documented by: 


Iopamidol (Iopamidol 755 Mg/Ml 500 Ml Multipack Bottle)  100 ml IVPUSH ONETIME 

STA


   Stop: 05/06/21 10:09


   Last Admin: 05/06/21 10:09 Dose:  100 ml


   Documented by: 


Ondansetron HCl (Ondansetron 4 Mg/2 Ml Sdv)  4 mg IVPUSH ONETIME ONE


   Stop: 05/06/21 08:37


   Last Admin: 05/06/21 09:15 Dose:  4 mg


   Documented by: 


Sodium Chloride (Sodium Chloride 0.9% 2.5 Ml Syringe)  2.5 ml FLUSH ASDIRECTED 

PRN


   PRN Reason: Keep Vein Open


Sodium Chloride (Sodium Chloride 0.9% 10 Ml Syringe)  10 ml FLUSH ASDIRECTED PRN


   PRN Reason: Keep Vein Open


   Last Admin: 05/06/21 09:15 Dose:  10 ml


   Documented by: 


Sodium Chloride (Sodium Chloride 0.9% 2.5 Ml Syringe)  2.5 ml FLUSH ASDIRECTED 

PRN


   PRN Reason: Keep Vein Open


   Last Admin: 05/06/21 09:15 Dose:  2.5 ml


   Documented by: 


Sodium Chloride (Sodium Chloride 0.9% 10 Ml Sdv)  10 ml IV ASDIRECTED PRN


   PRN Reason: IV Use











- Exam


Physical Findings Comments:: 





Mental Status:


   General: Normal activity, good hygiene, appropriate appearance.


   Level of consciousness: Awake, alert.





   


   


Cranial Nerves:  VFFTC, PERRL, EOM  nystagmus in all directions of gaze, Mild 

cerebellar dysarthria. 





Motor: Power 5/5 throughout.  





Sensation: Sensation is intact to temperature. 





Coordination:    Finger to nose mildly impaired on the left, heel to shin 

impaired bilaterally. 





Gait: mild truncal ataxia with sitting eyes closed, gait not assessed. 








CTA head and neck 5/6/2021  abrupt cut off of right vertebral arter just above 

origin with minimal perfusion distal vertebral artery


CT head loss of gray white differentiation in right cerebellum


MRI brain w/o contrast 5/6/2021  extensive acute infarct of right cerebellum, 

bilateral occipital infarcts, punctate hemorrhage in left occipital infarct


Labs 5/6/2021  HgbA1c 5.7 5/7/021  





Sepsis Event Note





- Evaluation


Sepsis Screening Result: No Definite Risk





- Focused Exam


Vital Signs: 


                                   Vital Signs











  Temp Pulse Resp BP Pulse Ox


 


 05/07/21 08:00  37.1 C  69  18  148/78 H  97


 


 05/07/21 03:29  37.0 C  66  19  137/85  97


 


 05/07/21 00:00  37.3 C  68  14  128/79  95














Consult PN Assessment/Plan


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


SNOMED Code(s): 014996596


   Code(s): I63.9 - CEREBRAL INFARCTION, UNSPECIFIED; I77.70 - DISSECTION OF 

UNSPECIFIED ARTERY   Current Visit: Yes   


Assessment:: 


--BP ranging 120s-140s/70s  80s.  Based on these ranges, I would defer 

consideration of BP medication to outpatient follow up. 


Continue  mg daily.  Transition to warfarin or NOAC 10-14 days after 

stroke 5/15  5/19 when risk of hemorrhagic transformation is lower.


-echo pending, telemetry


-Lipitor 80 mg 


-PT/OT/speech  





Problem List Initiated/Reviewed/Updated: Yes

## 2021-05-07 NOTE — PCM.DCSUM1
**Discharge Summary





- Hospital Course


Brief History: This 74-year-old male with little to no past medical history 

presented to the ER today with complaints of balance issues along with dizziness

and left upper limb numbness along with clumsiness.  He reports yesterday around

noonhe started having some nausea and vomiting along with diarrhea.  He reports

he was up vomiting most of the night.  He also reports difficulty with vision as

he feels that may be doubled or like his vision is crossed which he reports 

started maybe around 5:00 in the evening.  He reports that he is having 

difficulty walking likely secondary to his vision and feels very out of sorts 

when trying to walk.  He denies any trauma no recent falls or motor vehicle 

accidents.  He denies any fevers chills, neck pain, palpitations, chest pain, 

shortness of breath.  He denies any abdominal pain no constipation or diarrhea, 

no dysuria or troubles urinating.  Denies any black or bloody bowel movements.  

He denies recreational drug use and no alcohol use.  He reports he does not use 

tobacco on a regular basis but does intermittently smoke cigars.  He denies any 

family history of CAD, diabetes and or stroke.  Reports his father did have a 

stroke in his mid 80s but otherwise most of family is otherwise healthy.  In the

ER leukocytosis noted at 15,000 sodium 139, potassium 3.5, BUN 15 creatinine 1.0

glucose 136.  Troponin negative TSH 0.01 T4 1.34 T3 2.3 Covid negative.  Due to 

stroke symptoms head CT was obtained which was noted to have question evolving 

loss of gray-white differentiation within the right superior and inferior 

cerebellum which may represent subacute infarct.  CTA of head and neck were then

obtained secondary to findings on head CT.  CTA of head and neck revealed 

complete occlusion of the right vertebral artery extending from its origin to 

its proximal to mid intracranial segment with retrograde filling of the mid d

istal intracranial segment by way of retrograde flow from the basilar trunk.  No

intracranial large vessel occlusion or flow-limiting stenosis, mildly patent 

cervical internal carotid artery and left vertebral artery.  On-call neurologist

Dr. Pulido was consulted by ER physician at this time.  Patient is outside of

the window for TPA and recommended initiation of antiplatelet therapy with full 

dose aspirin at 325 mg.  Dr. Pulido will consult and felt patient was safe 

for admission within our facility.


Diagnosis: Stroke: Yes


Modified Lea Scale: Mod.Disablility Requiring Some Help,Able to Walk Without 

Assistance


Modified Lea Scale Score: 3





- Discharge Data


Discharge Date: 05/07/21


Discharge Disposition: DC/Tfer to Acute Hospital 02


Condition: Serious





- Referral to Home Health


Primary Care Physician: 


PCP None








- Discharge Diagnosis/Problem(s)


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


SNOMED Code(s): 837220782


   ICD Code: I63.9 - CEREBRAL INFARCTION, UNSPECIFIED; I77.70 - DISSECTION OF 

UNSPECIFIED ARTERY   Status: Acute   Current Visit: Yes   





- Patient Summary/Data


Consults: 


                                  Consultations





05/06/21 12:52


OT Evaluation and Treatment [CONS] Routine 


PT Evaluation and Treatment [CONS] Routine 











Hospital Course: 





Admission diagnoses


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





Discharge diagnoses


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

worsening edema and headache








Patient was admitted secondary to cerebral ischemic stroke due to arterial 

dissection right vertebral artery.  Patient was approximately 16 or greater 

hours post normal symptoms and was not a TPA candidate.  Dr. Pulido neurology

 consult and felt he was stable for admission within our facility.  Patient 

remained stable vital signs stable.  Headache has been noted since arrival but 

this afternoon headache started to worsen.  No worsening neurological findings. 

 Repeat CT performed this afternoon due to worsening headache.  No 

transformation to hemorrhage noted but per verbal discussion with Dr. Pulido 

worsening edema is noted.  She spoke with Dr. Oconnor, neurosurgery in [place] 

who recommended transfer to ICU for further evaluation and treatment.  Patient 

has been treated with aspirin 325 mg daily along with atorvastatin 80 mg.  

Patient given morphine 2 mg IV for headache along with Norco 5/3/2025.  Blood 

pressure has remained stable until recent headache.  Blood pressures have been 

slightly elevated 170s over 80s.  Patient will be transferred via air ambulance 

to [facility] with Dr Amaya, MIESHA MD accepting physician an intensivist is 

tied up with patient care but aware of patient.  Patient notified and in 

agreement with transfer for further evaluation and treatment.





- Patient Instructions


Diet: NPO





- Discharge Plan


*PRESCRIPTION DRUG MONITORING PROGRAM REVIEWED*: Not Applicable


*COPY OF PRESCRIPTION DRUG MONITORING REPORT IN PATIENT JASIEL: Not Applicable


Home Medications: 


                                    Home Meds





Multivitamin 1 each PO DAILY 05/06/21 [History]


Aspirin [Ecotrin EC] 325 mg PO DAILY  tab.ec 05/07/21 [Rx]


Labetalol [Normodyne] 20 mg IVPUSH Q4H PRN  mdv 05/07/21 [Rx]


atorvaSTATin [Lipitor] 80 mg PO BEDTIME  tablet 05/07/21 [Rx]








Referrals: 


Nadir Witt MD [Physician] - 05/17/21 2:30 pm





- Discharge Summary/Plan Comment


DC Time >30 min.: No





- Patient Data


Vitals - Most Recent: 


                                Last Vital Signs











Temp  97.4 F   05/07/21 13:39


 


Pulse  68   05/07/21 13:39


 


Resp  20   05/07/21 13:39


 


BP  156/98 H  05/07/21 13:39


 


Pulse Ox  96   05/07/21 13:39











Weight - Most Recent: 95.118 kg


I&O - Last 24 hours: 


                                 Intake & Output











 05/07/21 05/07/21 05/07/21





 06:59 14:59 22:59


 


Intake Total 1200  


 


Output Total 1950  


 


Balance -750  











Lab Results - Last 24 hrs: 


                         Laboratory Results - last 24 hr











  05/06/21 05/07/21 05/07/21 Range/Units





  17:40 05:15 05:15 


 


WBC   14.22 H   (4.0-11.0)  K/uL


 


RBC   4.91   (4.50-5.90)  M/uL


 


Hgb   14.2   (13.0-17.0)  g/dL


 


Hct   41.6   (38.0-50.0)  %


 


MCV   84.7   (80.0-98.0)  fL


 


MCH   28.9   (27.0-32.0)  pg


 


MCHC   34.1   (31.0-37.0)  g/dL


 


RDW Std Deviation   45.1   (28.0-62.0)  fl


 


RDW Coeff of Sumi   15   (11.0-15.0)  %


 


Plt Count   187   (150-400)  K/uL


 


MPV   11.40   (7.40-12.00)  fL


 


Neut % (Auto)   74.6   (48.0-80.0)  %


 


Lymph % (Auto)   17.5   (16.0-40.0)  %


 


Mono % (Auto)   7.2   (0.0-15.0)  %


 


Eos % (Auto)   0.6   (0.0-7.0)  %


 


Baso % (Auto)   0.1   (0.0-1.5)  %


 


Neut # (Auto)   10.6 H   (1.4-5.7)  K/uL


 


Lymph # (Auto)   2.5 H   (0.6-2.4)  K/uL


 


Mono # (Auto)   1.0 H   (0.0-0.8)  K/uL


 


Eos # (Auto)   0.1   (0.0-0.7)  K/uL


 


Baso # (Auto)   0.0   (0.0-0.1)  K/uL


 


Nucleated RBC %   0.0   /100WBC


 


Nucleated RBCs #   0   K/uL


 


Sodium    138  (136-148)  mmol/L


 


Potassium    3.7  (3.5-5.1)  mmol/L


 


Chloride    105  ()  mmol/L


 


Carbon Dioxide    24.9  (21.0-32.0)  mmol/L


 


BUN    13  (7.0-18.0)  mg/dL


 


Creatinine    0.9  (0.8-1.3)  mg/dL


 


Est Cr Clr Drug Dosing    80.23  mL/min


 


Estimated GFR (MDRD)    > 60.0  ml/min


 


Glucose    107 H  ()  mg/dL


 


Calcium    8.0 L  (8.5-10.1)  mg/dL


 


Magnesium    1.9  (1.8-2.4)  mg/dL


 


Triglycerides    120  (0-200)  mg/dL


 


Cholesterol    168  ()  mg/dL


 


LDL Cholesterol, Calc    120  ()  mg/dL


 


VLDL Cholesterol    24  (5-55)  mg/dL


 


HDL Cholesterol    24 L  (40-60)  mg/dL


 


Cholesterol/HDL Ratio    7.0 H  (3.3-6.0)  


 


Urine Color  YELLOW    


 


Urine Appearance  HAZY    


 


Urine pH  6.5    (5.0-8.0)  


 


Ur Specific Gravity  1.020    (1.001-1.035)  


 


Urine Protein  NEGATIVE    (NEGATIVE)  mg/dL


 


Urine Glucose (UA)  NEGATIVE    (NEGATIVE)  mg/dL


 


Urine Ketones  15 H    (NEGATIVE)  mg/dL


 


Urine Occult Blood  TRACE-INTACT H    (NEGATIVE)  


 


Urine Nitrite  NEGATIVE    (NEGATIVE)  


 


Urine Bilirubin  NEGATIVE    (NEGATIVE)  


 


Urine Urobilinogen  0.2    (<2.0)  EU/dL


 


Ur Leukocyte Esterase  NEGATIVE    (NEGATIVE)  


 


Urine RBC  0-3    (0-2/HPF)  


 


Urine WBC  0-2    (0-5/HPF)  


 


Ur Epithelial Cells  RARE    (NONE-FEW)  


 


Urine Bacteria  FEW    (NEGATIVE)  











Med Orders - Current: 


                               Current Medications





Acetaminophen (Acetaminophen 325 Mg Tab)  650 mg PO Q4H PRN


   PRN Reason: Pain (Mild 1-3)/fever


   Last Admin: 05/07/21 14:18 Dose:  650 mg


   Documented by: 


Hydrocodone Bitart/Acetaminophen (Acetaminophen/Hydrocodone 325-5 Mg Tab)  1 tab

 PO Q4H PRN


   PRN Reason: Pain


   Last Admin: 05/07/21 16:09 Dose:  1 tab


   Documented by: 


Aspirin (Aspirin 325 Mg Tab.Ec)  325 mg PO DAILY [facility]


   Last Admin: 05/07/21 08:05 Dose:  325 mg


   Documented by: 


Atorvastatin Calcium (Atorvastatin 40 Mg Tab)  80 mg PO BEDTIME [facility]


   Last Admin: 05/06/21 20:40 Dose:  80 mg


   Documented by: 


Docusate Sodium (Docusate Sodium 100 Mg Cap)  100 mg PO DAILY [facility]


   Last Admin: 05/07/21 08:00 Dose:  100 mg


   Documented by: 


Labetalol HCl (Labetalol 100 Mg/20 Ml Mdv)  20 mg IVPUSH Q4H PRN


   PRN Reason: BP over 180/105


Multivitamins/Minerals/Vitamin C (Multivitamin Tab)  1 tab PO DAILY [facility]


   Last Admin: 05/07/21 08:00 Dose:  1 tab


   Documented by: 


Ondansetron HCl (Ondansetron 4 Mg/2 Ml Sdv)  4 mg IVPUSH Q4H PRN


   PRN Reason: Nausea


Sodium Chloride (Sodium Chloride 0.9% 2.5 Ml Syringe)  2.5 ml FLUSH ASDIRECTED 

PRN


   PRN Reason: Keep Vein Open





Discontinued Medications





Aspirin (Aspirin 325 Mg Tab)  325 mg PO ONETIME ONE


   Stop: 05/06/21 09:58


   Last Admin: 05/06/21 10:07 Dose:  325 mg


   Documented by: 


Sodium Chloride (Normal Saline)  1,000 mls @ 125 mls/hr IV NOW STA


   Stop: 05/06/21 16:33


   Last Admin: 05/06/21 09:15 Dose:  125 mls/hr


   Documented by: 


Sodium Chloride (Normal Saline)  1,000 mls @ 999 mls/hr IV BOLUS ONE


   Stop: 05/06/21 09:34


   Last Admin: 05/06/21 09:15 Dose:  999 mls/hr


   Documented by: 


Iopamidol (Iopamidol 755 Mg/Ml 500 Ml Multipack Bottle)  100 ml IVPUSH ONETIME 

STA


   Stop: 05/06/21 10:09


   Last Admin: 05/06/21 10:09 Dose:  100 ml


   Documented by: 


Morphine Sulfate (Morphine 2 Mg/Ml Syringe)  2 mg IVPUSH ONETIME ONE


   Stop: 05/07/21 15:54


   Last Admin: 05/07/21 16:10 Dose:  2 mg


   Documented by: 


Ondansetron HCl (Ondansetron 4 Mg/2 Ml Sdv)  4 mg IVPUSH ONETIME ONE


   Stop: 05/06/21 08:37


   Last Admin: 05/06/21 09:15 Dose:  4 mg


   Documented by: 


Sodium Chloride (Sodium Chloride 0.9% 2.5 Ml Syringe)  2.5 ml FLUSH ASDIRECTED 

PRN


   PRN Reason: Keep Vein Open


Sodium Chloride (Sodium Chloride 0.9% 10 Ml Syringe)  10 ml FLUSH ASDIRECTED PRN


   PRN Reason: Keep Vein Open


   Last Admin: 05/06/21 09:15 Dose:  10 ml


   Documented by: 


Sodium Chloride (Sodium Chloride 0.9% 2.5 Ml Syringe)  2.5 ml FLUSH ASDIRECTED 

PRN


   PRN Reason: Keep Vein Open


   Last Admin: 05/06/21 09:15 Dose:  2.5 ml


   Documented by: 


Sodium Chloride (Sodium Chloride 0.9% 10 Ml Sdv)  10 ml IV ASDIRECTED PRN


   PRN Reason: IV Use











*Q Meaningful Use (DIS)





- VTE *Q


VTE Anticoagulation Contraindications: Med/TX Not Indicated/Need

## 2021-05-10 NOTE — ECHO
EXAM DATE: 05/06/21



PATIENT'S AGE: 75



The ECHO report has been scanned into [facility] and can be seen in this patient's
EMR (Electronic Medical Record) under the REPORTS section. The report has also 
been scanned into PACS.



TORREY